# Patient Record
Sex: MALE | Race: WHITE | NOT HISPANIC OR LATINO | Employment: FULL TIME | URBAN - METROPOLITAN AREA
[De-identification: names, ages, dates, MRNs, and addresses within clinical notes are randomized per-mention and may not be internally consistent; named-entity substitution may affect disease eponyms.]

---

## 2018-01-08 ENCOUNTER — APPOINTMENT (OUTPATIENT)
Dept: MEDICAL GROUP | Facility: MEDICAL CENTER | Age: 38
End: 2018-01-08
Payer: COMMERCIAL

## 2018-01-19 ENCOUNTER — OFFICE VISIT (OUTPATIENT)
Dept: MEDICAL GROUP | Facility: MEDICAL CENTER | Age: 38
End: 2018-01-19
Payer: COMMERCIAL

## 2018-01-19 VITALS
WEIGHT: 177.8 LBS | SYSTOLIC BLOOD PRESSURE: 120 MMHG | OXYGEN SATURATION: 98 % | BODY MASS INDEX: 26.33 KG/M2 | HEIGHT: 69 IN | TEMPERATURE: 98.3 F | HEART RATE: 78 BPM | RESPIRATION RATE: 16 BRPM | DIASTOLIC BLOOD PRESSURE: 78 MMHG

## 2018-01-19 DIAGNOSIS — F90.0 ATTENTION DEFICIT HYPERACTIVITY DISORDER (ADHD), PREDOMINANTLY INATTENTIVE TYPE: ICD-10-CM

## 2018-01-19 DIAGNOSIS — Z00.00 WELL ADULT EXAM: ICD-10-CM

## 2018-01-19 PROCEDURE — 99385 PREV VISIT NEW AGE 18-39: CPT | Performed by: FAMILY MEDICINE

## 2018-01-19 RX ORDER — METHYLPHENIDATE HYDROCHLORIDE 20 MG/1
20 CAPSULE, EXTENDED RELEASE ORAL EVERY MORNING
COMMUNITY
End: 2018-07-25 | Stop reason: SDUPTHER

## 2018-01-19 ASSESSMENT — PATIENT HEALTH QUESTIONNAIRE - PHQ9: CLINICAL INTERPRETATION OF PHQ2 SCORE: 0

## 2018-01-19 NOTE — ASSESSMENT & PLAN NOTE
This is a chronic problem for this patient that he takes medication for depending on his overall state of health. When he is overly tired or findings that his thoughts are racing he will take the Metadate CD 20 mg daily. He does not currently need a prescription. He will notify me when he needs to get a prescription filled.

## 2018-01-19 NOTE — LETTER
January 19, 2018     Xander Saldivar  No address on file.      Dear Xander:    Thank you for enrolling in [employer]. Please follow the instructions below to securely access your online medical record. [employer] allows you to send messages to your healthcare team, view certain test results, renew your prescriptions, schedule appointments, and more.     How Do I Sign Up?  1. In your Internet browser, go to  [email or web address]  2. Click on the Sign Up Now link in the Sign In box. You will see the New Member Sign Up page.  3. Enter your [employer] Access Code exactly as it appears below (case sensitive). You will not need to use this code after you’ve completed the sign-up process. If you do not sign up before the expiration date, you must request a new code.  [employer] Access Code: [email or web address]  Expires: 2/18/2018  1:57 PM    4. Enter your Email address and Date of Birth (mm/dd/yyyy) as indicated and click Submit. You will be taken to the next sign-up page.  5. Create a [employer] ID (case sensitive) . This will be your [employer] login ID and cannot be changed, so think of one that is secure and easy to remember.  6. Create a [employer] password  (case sensitive).   · Your password must be a length of at least 6 characters/digits.  · It must include at least 1 numeric.  · You can change your password at any time.  7. Enter your Password Reset Question and Answer. This can be used at a later time if you forget your password.   8. Enter your e-mail address. You will receive e-mail notification when new information is available in [employer].  9. Click Sign Up. You can now view your medical record.     Additional Information  Please contact [employer] Customer Support at [phone] for any questions . Remember, [employer] is NOT to be used for urgent needs. For medical emergencies, dial 911.          Introducing [employer]    [facility]’s Secure, Online Health Connection      [facility] now offers convenient,  online access to your healthcare team and personal health information.  [employer] makes managing your healthcare easier than ever, and allows you to:  • Email your healthcare provider securely and privately  • Access your test results  • Request prescription refills 24 hours a day  • View your personal medical records from home  • Schedule or change your appointments  • View your Insurance and Billing Information  • Pay bills online ? Coming soon!        Sign below to get started:  I hereby request access to [employer] application.  I agree to abide by the [employer] Terms and Conditions, which will be provided to me upon activating my account.       __________________________________        _________________________  Name (Please Print)          Date of Birth     __________________________________       _________________________  Signature          Primary Care Provider      _______________  Date                          *For Internal Use Only: Please scan this form into the patient’s chart. Click on  - Select Patient - Attach to Encounter:  - Document Type: Consent   - Document Description: MyChart Consent

## 2018-01-19 NOTE — PROGRESS NOTES
CC: Here for an annual wellness exam    HISTORY OF THE PRESENT ILLNESS: Patient is a 37 y.o. male. This pleasant patient is here today to establish care and have an annual wellness exam. This patient has been very healthy all of his life. He has one chronic health condition that he has done well with since childhood.    Health Maintenance: Patient does need to get a flu shot and he will do that at the pharmacy      Well adult exam  This patient is very healthy, does have some ADD for which he occasionally will use Metadate CD 20 mg. He does not take it all the time. In his previous job he had to use it more often. He only uses it now times where he is not able to get adequate sleep and knows he will have difficulty with concentration.    Attention deficit hyperactivity disorder (ADHD), predominantly inattentive type  This is a chronic problem for this patient that he takes medication for depending on his overall state of health. When he is overly tired or findings that his thoughts are racing he will take the Metadate CD 20 mg daily. He does not currently need a prescription. He will notify me when he needs to get a prescription filled.    Allergies: Cillins [penicillins]    Current Outpatient Prescriptions Ordered in [facility]   Medication Sig Dispense Refill   • methylphenidate (METADATE CD) 20 MG Cap CR Take 20 mg by mouth every morning.       No current [facility]-ordered facility-administered medications on file.        Past Medical History:   Diagnosis Date   • Attention deficit hyperactivity disorder (ADHD), predominantly inattentive type 1/19/2018   • Well adult exam 1/19/2018       Past Surgical History:   Procedure Laterality Date   • ACL RECONSTRUCTION SCOPE Right    • DENTAL EXTRACTION(S)         Social History   Substance Use Topics   • Smoking status: Never Smoker   • Smokeless tobacco: Never Used   • Alcohol use 1.2 oz/week     2 Cans of beer per week       Social History     Social History Narrative   • No  "narrative on file       Family History   Problem Relation Age of Onset   • No Known Problems Mother    • No Known Problems Father    • Cancer Sister      thyroid       ROS:     - Constitutional: Negative for fever, chills, unexpected weight change, and fatigue/generalized weakness.     - HEENT: Negative for headaches, vision changes, hearing changes, ear pain, ear discharge, rhinorrhea, sinus congestion, sore throat, and neck pain.      - Respiratory: Negative for cough, sputum production, chest congestion, dyspnea, wheezing, and crackles.      - Cardiovascular: Negative for chest pain, palpitations, orthopnea, and bilateral lower extremity edema.     - Gastrointestinal: Negative for heartburn, nausea, vomiting, abdominal pain, hematochezia, melena, diarrhea, constipation, and greasy/foul-smelling stools.     - Genitourinary: Negative for dysuria, polyuria, hematuria, pyuria, urinary urgency, and urinary incontinence.     - Musculoskeletal: Negative for myalgias, back pain, and joint pain.     - Skin: Negative for rash, itching, cyanotic skin color change.     - Neurological: Negative for dizziness, tingling, tremors, focal sensory deficit, focal weakness and headaches.     - Endo/Heme/Allergies: Does not bruise/bleed easily.     - Psychiatric/Behavioral: Negative for depression, suicidal/homicidal ideation and memory loss.        - NOTE: All other systems reviewed and are negative, except as in HPI.      .      Exam: Blood pressure 120/78, pulse 78, temperature 36.8 °C (98.3 °F), resp. rate 16, height 1.753 m (5' 9\"), weight 80.6 kg (177 lb 12.8 oz), SpO2 98 %. Body mass index is 26.26 kg/m².    General: Normal appearing. No distress.  HEENT: Normocephalic. Eyes conjunctiva clear lids without ptosis, pupils equal and reactive to light accommodation, ears normal shape and contour, canals are clear bilaterally, tympanic membranes are benign, nasal mucosa benign, oropharynx is without erythema, edema or exudates. "   Neck: Supple without JVD or bruit. Thyroid is not enlarged.  Pulmonary: Clear to ausculation.  Normal effort. No rales, ronchi, or wheezing.  Cardiovascular: Regular rate and rhythm without murmur. Carotid and radial pulses are intact and equal bilaterally.  Abdomen: Soft, nontender, nondistended. Normal bowel sounds. Liver and spleen are not palpable  Neurologic: Grossly nonfocal  Lymph: No cervical, supraclavicular or axillary lymph nodes are palpable  Skin: Warm and dry.  No obvious lesions.  Musculoskeletal: Normal gait. No extremity cyanosis, clubbing, or edema.  Psych: Normal mood and affect. Alert and oriented x3. Judgment and insight is normal.    Please note that this dictation was created using voice recognition software. I have made every reasonable attempt to correct obvious errors, but I expect that there are errors of grammar and possibly content that I did not discover before finalizing the note.      Assessment/Plan  1. Well adult exam  Completed today. Patient will get a conference of metabolic panel lipid profile and a CBC. Those are all in the normal range she does not need to be seen again for one year.  - COMP METABOLIC PANEL; Future  - LIPID PROFILE; Future  - CBC WITH DIFFERENTIAL; Future    2. Attention deficit hyperactivity disorder (ADHD), predominantly inattentive type  Controlled with current meds and lifestyle. Patient will let me know when he needs a refill of his meds.

## 2018-01-26 RX ORDER — CIPROFLOXACIN HYDROCHLORIDE 3.5 MG/ML
1 SOLUTION/ DROPS TOPICAL
Qty: 1 BOTTLE | Refills: 0 | Status: SHIPPED | OUTPATIENT
Start: 2018-01-26 | End: 2018-01-26 | Stop reason: SDUPTHER

## 2018-01-26 RX ORDER — CIPROFLOXACIN HYDROCHLORIDE 3.5 MG/ML
1 SOLUTION/ DROPS TOPICAL
Qty: 1 BOTTLE | Refills: 0 | Status: SHIPPED | OUTPATIENT
Start: 2018-01-26 | End: 2018-07-25

## 2018-04-05 ENCOUNTER — NON-PROVIDER VISIT (OUTPATIENT)
Dept: URGENT CARE | Facility: CLINIC | Age: 38
End: 2018-04-05

## 2018-04-05 DIAGNOSIS — Z11.1 PPD SCREENING TEST: ICD-10-CM

## 2018-04-05 PROCEDURE — 86580 TB INTRADERMAL TEST: CPT | Performed by: NURSE PRACTITIONER

## 2018-04-07 ENCOUNTER — NON-PROVIDER VISIT (OUTPATIENT)
Dept: URGENT CARE | Facility: CLINIC | Age: 38
End: 2018-04-07

## 2018-04-07 LAB — TB WHEAL 3D P 5 TU DIAM: NORMAL MM

## 2018-05-24 ENCOUNTER — APPOINTMENT (OUTPATIENT)
Dept: MEDICAL GROUP | Age: 38
End: 2018-05-24
Payer: COMMERCIAL

## 2018-07-25 ENCOUNTER — OFFICE VISIT (OUTPATIENT)
Dept: MEDICAL GROUP | Facility: MEDICAL CENTER | Age: 38
End: 2018-07-25
Payer: COMMERCIAL

## 2018-07-25 VITALS
TEMPERATURE: 98.9 F | SYSTOLIC BLOOD PRESSURE: 112 MMHG | HEART RATE: 84 BPM | OXYGEN SATURATION: 94 % | HEIGHT: 69 IN | DIASTOLIC BLOOD PRESSURE: 72 MMHG | BODY MASS INDEX: 26.69 KG/M2 | WEIGHT: 180.2 LBS

## 2018-07-25 DIAGNOSIS — R53.82 CHRONIC FATIGUE: ICD-10-CM

## 2018-07-25 DIAGNOSIS — R19.02 LEFT UPPER QUADRANT ABDOMINAL MASS: ICD-10-CM

## 2018-07-25 DIAGNOSIS — F90.0 ATTENTION DEFICIT HYPERACTIVITY DISORDER (ADHD), PREDOMINANTLY INATTENTIVE TYPE: ICD-10-CM

## 2018-07-25 PROCEDURE — 99214 OFFICE O/P EST MOD 30 MIN: CPT | Performed by: FAMILY MEDICINE

## 2018-07-25 RX ORDER — METHYLPHENIDATE HYDROCHLORIDE 20 MG/1
20 CAPSULE, EXTENDED RELEASE ORAL 2 TIMES DAILY
Qty: 56 CAP | Refills: 0 | Status: SHIPPED | OUTPATIENT
Start: 2018-09-19 | End: 2018-08-09 | Stop reason: SDUPTHER

## 2018-07-25 RX ORDER — METHYLPHENIDATE HYDROCHLORIDE 20 MG/1
20 CAPSULE, EXTENDED RELEASE ORAL 2 TIMES DAILY
Qty: 56 CAP | Refills: 0 | Status: SHIPPED | OUTPATIENT
Start: 2018-07-25 | End: 2018-07-25 | Stop reason: SDUPTHER

## 2018-07-25 RX ORDER — METHYLPHENIDATE HYDROCHLORIDE 20 MG/1
20 CAPSULE, EXTENDED RELEASE ORAL 2 TIMES DAILY
Qty: 56 CAP | Refills: 0 | Status: SHIPPED | OUTPATIENT
Start: 2018-08-22 | End: 2018-07-25 | Stop reason: SDUPTHER

## 2018-07-25 NOTE — ASSESSMENT & PLAN NOTE
This is a chronic problem that has been ongoing for about 3 months.  Patient has recently started a new job with a great deal of stress as well as being out of his ADHD meds which may all be contributing to this.  I do want to get some blood work and screen him for anemia and thyroid problems as well as metabolic problems.

## 2018-07-25 NOTE — PROGRESS NOTES
CC:Diagnoses of Attention deficit hyperactivity disorder (ADHD), predominantly inattentive type, Left upper quadrant abdominal mass, and Chronic fatigue were pertinent to this visit.    HISTORY OF PRESENT ILLNESS: Patient is a 38 y.o. male established patient who presents today to discuss several conditions of concern including a mass that has been present for approximately 2 months on the left flank area in the mid axillary line.  In addition we had a discussion about his attention deficit hyperactivity disorder and the medications.    Health Maintenance: Completed    Attention deficit hyperactivity disorder (ADHD), predominantly inattentive type  This is a chronic health problem that is well controlled with current medications and lifestyle measures.  Patient will continue with current meds.  Patient tells me that in the past it has bothered him that he required this med so he did not take it on a regular basis.  He is now under a great deal of stress with a new job and having to manage multiple projects in different areas.  He needs to be able to concentrate impeccably.  I recommended that he get back on his medication and take it on a daily basis.  He is willing to do this.    Obtained and reviewed patient utilization report from [facility] pharmacy database on 7/25/2018 3:10 PM  prior to writing prescription for controlled substance II, III or IV per [place] bill . Based on assessment of the report,my physical exam if necessary and the patient's health problem, the prescription is medically necessary.       Left upper quadrant abdominal mass  This is a problem that started about 2 months ago.  Showed up evidently after the patient had to do some heavy lifting there was some concern that it might be a torn muscle.  When in actuality it has continued in the same place and actually gotten slightly larger.  There was no bruising associated with it.  There are times where it is tender.  But after watching the  way the patient palpates that I can imagine that it would get tenderU upon palpation it appears to be a lipoma and it may have to others in close proximity.  We will check with ultrasound.    Chronic fatigue  This is a chronic problem that has been ongoing for about 3 months.  Patient has recently started a new job with a great deal of stress as well as being out of his ADHD meds which may all be contributing to this.  I do want to get some blood work and screen him for anemia and thyroid problems as well as metabolic problems.      Patient Active Problem List    Diagnosis Date Noted   • Left upper quadrant abdominal mass 07/25/2018   • Chronic fatigue 07/25/2018   • Well adult exam 01/19/2018   • Attention deficit hyperactivity disorder (ADHD), predominantly inattentive type 01/19/2018      Allergies:Cillins [penicillins]    Current Outpatient Prescriptions   Medication Sig Dispense Refill   • [START ON 9/19/2018] methylphenidate (METADATE CD) 20 MG Cap CR Take 1 Cap by mouth 2 Times a Day for 28 days. 56 Cap 0     No current facility-administered medications for this visit.        Social History   Substance Use Topics   • Smoking status: Never Smoker   • Smokeless tobacco: Never Used   • Alcohol use 1.2 oz/week     2 Cans of beer per week     Social History     Social History Narrative   • No narrative on file       Family History   Problem Relation Age of Onset   • No Known Problems Mother    • No Known Problems Father    • Cancer Sister         thyroid        ROS:     - Constitutional:  Negative for fever, chills, unexpected weight change, and fatigue/generalized weakness.    - HEENT:  Negative for headaches, vision changes, hearing changes, ear pain, ear discharge, rhinorrhea, sinus congestion, sore throat, and neck pain.      - Respiratory: Negative for cough, sputum production, chest congestion, dyspnea, wheezing, and crackles.      - Cardiovascular: Negative for chest pain, palpitations, orthopnea, and  "bilateral lower extremity edema.     - Gastrointestinal: Negative for heartburn, nausea, vomiting, abdominal pain, hematochezia, melena, diarrhea, constipation, and greasy/foul-smelling stools.     - Genitourinary: Negative for dysuria, polyuria, hematuria, pyuria, urinary urgency, and urinary incontinence.     - Musculoskeletal: Negative for myalgias, back pain, and joint pain.     - Skin: Mass as described above otherwise denies rash, itching, cyanotic skin color change.     - Neurological: Negative for dizziness, tingling, tremors, focal sensory deficit, focal weakness and headaches.     - Endo/Heme/Allergies: Does not bruise/bleed easily.     - Psychiatric/Behavioral: Negative for depression, suicidal/homicidal ideation and memory loss.          - NOTE: All other systems reviewed and are negative, except as in HPI.      Exam:    Blood pressure 112/72, pulse 84, temperature 37.2 °C (98.9 °F), height 1.74 m (5' 8.5\"), weight 81.7 kg (180 lb 3.2 oz), SpO2 94 %. Body mass index is 27 kg/m².    General:  Well nourished, well developed male in NAD  Head is grossly normal.  Neck: Supple without JVD or bruit. Thyroid is not enlarged.  Pulmonary: Clear to ausculation and percussion.  Normal effort. No rales, ronchi, or wheezing.  Cardiovascular: Regular rate and rhythm without murmur. Carotid and radial pulses are intact and equal bilaterally.  Abdomen: Flat soft nondistended no hepatosplenomegaly.  There is a mass measuring approximately 1-1/2 cm in length and half centimeter in width in the mid axillary line at approximately the level of T8.  I suspect this is a lipoma has a rubbery consistency but is not freely movable..    Please note that this dictation was created using voice recognition software. I have made every reasonable attempt to correct obvious errors, but I expect that there are errors of grammar and possibly content that I did not discover before finalizing the note.    Assessment/Plan:  1. Attention " deficit hyperactivity disorder (ADHD), predominantly inattentive type  Uncontrolled, patient will get back on his medication taking it on a daily basis.  Gave him 3 one-month prescriptions.  We will see him back before he runs out of medication.  He will notify me via my chart as to how he is doing on the medication.  - methylphenidate (METADATE CD) 20 MG Cap CR; Take 1 Cap by mouth 2 Times a Day for 28 days.  Dispense: 56 Cap; Refill: 0    2. Left upper quadrant abdominal mass  Uncontrolled, I suspect this is a lipoma, patient willing to get ultrasound to look at this mass.  If it is a lipoma he can consider having it removed.  He states that he can feel it with certain movements and it can be uncomfortable.  - US-ABDOMEN LIMITED; Future    3. Chronic fatigue  Uncontrolled, I would like to check blood work to make certain that there is not something else contributing to his fatigue of the metabolic nature.  I suspect it has more to do with his new projects and new position.  - CBC WITH DIFFERENTIAL; Future  - COMP METABOLIC PANEL; Future  - TSH WITH REFLEX TO FT4; Future

## 2018-07-25 NOTE — ASSESSMENT & PLAN NOTE
This is a chronic health problem that is well controlled with current medications and lifestyle measures.  Patient will continue with current meds.      Obtained and reviewed patient utilization report from [facility] pharmacy database on 7/25/2018 3:10 PM  prior to writing prescription for controlled substance II, III or IV per [place] bill . Based on assessment of the report,my physical exam if necessary and the patient's health problem, the prescription is medically necessary.

## 2018-07-26 ENCOUNTER — TELEPHONE (OUTPATIENT)
Dept: MEDICAL GROUP | Facility: MEDICAL CENTER | Age: 38
End: 2018-07-26

## 2018-07-26 NOTE — TELEPHONE ENCOUNTER
MEDICATION PRIOR AUTHORIZATION NEEDED:    1. Name of Medication: Methylphenidate HCL ER    2. Requested By (Name of Pharmacy): [facility]     3. Is insurance on file current? Yes    4. What is the name & phone number of the 3rd party payor? [employer] [phone]

## 2018-07-27 NOTE — TELEPHONE ENCOUNTER
DOCUMENTATION OF PAR STATUS:    1. Name of Medication & Dose: Methylphenidate HCL ER 20mg      2. Name of Prescription Coverage Company & phone #: [employer] [phone]    3. Date Prior Auth Submitted: 7/26/2018    4. What information was given to obtain insurance decision? ID info    5. Prior Auth Status? Currently Pending    6. Patient Notified: N\A

## 2018-08-07 ENCOUNTER — TELEPHONE (OUTPATIENT)
Dept: MEDICAL GROUP | Facility: MEDICAL CENTER | Age: 38
End: 2018-08-07

## 2018-08-07 NOTE — LETTER
August 8, 2018  [facility]  Attention: Pharmacy review  Fax:[phone]        Xanderdenys Mota Saldivar  [address] [facility] [address]  Member ID:[id number] my patient listed above with the date of birth [id number]      To Whom It May Concern:    My patient listed above with the date of birth [birth year] requires methylphenidate extended release 20 mg capsules 1 p.o. twice daily to adequately control his ADHD.  He received a notice of noncoverage of prescription drugs because of the number of tablets that were prescribed.    This patient requires the 2 doses a day to adequately control his symptomatology throughout his day, but if he takes a 40 mg dose he has difficulty with concentration, experiences increased sweating and general anxiety.  I am requesting/appealing that you cover this medication for our mutual patient.        If you have any questions or concerns, please don't hesitate to call.        Sincerely,        Estephania Banks M.D.    Cc: Xander Saldivar    Electronically Signed

## 2018-08-07 NOTE — TELEPHONE ENCOUNTER
Please contact patient's insurance he is evidently having problems getting his ADHD medication filled.  His insurance told him that they sent test paperwork that has not been completed.  I am referring to methylphenidate ER 20 mg tablets.

## 2018-08-07 NOTE — LETTER
August 8, 2018  [facility]  Attention: Pharmacy review  Fax:[phone]    Xander Nakul Saldivar  [address] [facility] [address]    To Whom It May Concern:    My patient listed above with the date of birth [birth year] requires methylphenidate extended release 20 mg capsules 1 p.o. twice daily to adequately control his ADHD.  He received a notice of noncoverage of prescription drugs because of the number of tablets that were prescribed.    This patient requires the 2 doses a day to adequately control his symptomatology throughout his day, but if he takes a 40 mg dose he has difficulty with concentration, experiences increased sweating and general anxiety.  I am requesting/appealing that you cover this medication for our mutual patient.    If you have any questions or concerns, please don't hesitate to call.        Sincerely,        Estephania Banks M.D.    Cc: Xander Saldivar

## 2018-08-08 NOTE — TELEPHONE ENCOUNTER
Letter completed today and faxed to the patient's insurance company [facility].

## 2018-08-08 NOTE — TELEPHONE ENCOUNTER
"· Prior Auth paperwork received from [facility] requiring provider signature.     · All appropriate fields completed by Medical Assistant: No    · Paperwork placed in \"MA to Provider\" folder/basket. Awaiting provider completion/signature.     Paperwork sent in to inform that the quantity of the drug requested is more than the policy allows. They are requesting a smaller quantity or higher strength.  Peer to peer conversation available at:    [phone]      "

## 2018-08-09 ENCOUNTER — TELEPHONE (OUTPATIENT)
Dept: MEDICAL GROUP | Facility: MEDICAL CENTER | Age: 38
End: 2018-08-09

## 2018-08-09 DIAGNOSIS — F90.0 ATTENTION DEFICIT HYPERACTIVITY DISORDER (ADHD), PREDOMINANTLY INATTENTIVE TYPE: ICD-10-CM

## 2018-08-09 RX ORDER — METHYLPHENIDATE HYDROCHLORIDE 20 MG/1
20 CAPSULE, EXTENDED RELEASE ORAL 2 TIMES DAILY
Qty: 56 CAP | Refills: 0 | Status: SHIPPED | OUTPATIENT
Start: 2018-09-06 | End: 2018-08-09 | Stop reason: SDUPTHER

## 2018-08-09 RX ORDER — METHYLPHENIDATE HYDROCHLORIDE 20 MG/1
20 CAPSULE, EXTENDED RELEASE ORAL 2 TIMES DAILY
Qty: 56 CAP | Refills: 0 | Status: SHIPPED | OUTPATIENT
Start: 2018-10-04 | End: 2018-09-28

## 2018-08-09 RX ORDER — METHYLPHENIDATE HYDROCHLORIDE 20 MG/1
20 CAPSULE, EXTENDED RELEASE ORAL 2 TIMES DAILY
Qty: 56 CAP | Refills: 0 | Status: SHIPPED | OUTPATIENT
Start: 2018-08-09 | End: 2018-08-09 | Stop reason: SDUPTHER

## 2018-08-09 NOTE — TELEPHONE ENCOUNTER
1. Caller Name: Xander Saldivar                                           Call Back Number: [phone] (home)         Patient approves a detailed voicemail message: N\A    Prior auth from [facility] approved 60 capsules for 30 days but patient picked up 30 capsules prior to approval. [facility] asks that we send another rX over for the remainder of medication.

## 2018-08-09 NOTE — PROGRESS NOTES
This patient's insurance/pharmacy benefit would not cover his medications until we wrote a letter of appeal.  Now his pharmacy states that they need a new prescription for the coming 3 months.  I will write those prescriptions today and patient will pick them up and take them directly to the pharmacy.

## 2018-08-09 NOTE — TELEPHONE ENCOUNTER
Received a faxed approval from [facility] for Xander Saldivar's Metadate CD 20mg. The fax is scanned into the patient .

## 2018-08-13 NOTE — TELEPHONE ENCOUNTER
FINAL PRIOR AUTHORIZATION STATUS:    1.  Name of Medication & Dose: Methylphenidate HCL ER 20mg     2. Prior Auth Status: Approved through 08/2019     3. Action Taken: Pharmacy Notified: yes Patient Notified: yes

## 2018-08-30 ENCOUNTER — APPOINTMENT (RX ONLY)
Dept: URBAN - METROPOLITAN AREA CLINIC 4 | Facility: CLINIC | Age: 38
Setting detail: DERMATOLOGY
End: 2018-08-30

## 2018-08-30 DIAGNOSIS — L81.4 OTHER MELANIN HYPERPIGMENTATION: ICD-10-CM

## 2018-08-30 DIAGNOSIS — L82.1 OTHER SEBORRHEIC KERATOSIS: ICD-10-CM

## 2018-08-30 DIAGNOSIS — L57.0 ACTINIC KERATOSIS: ICD-10-CM

## 2018-08-30 DIAGNOSIS — D22 MELANOCYTIC NEVI: ICD-10-CM

## 2018-08-30 DIAGNOSIS — D18.0 HEMANGIOMA: ICD-10-CM

## 2018-08-30 PROBLEM — D22.5 MELANOCYTIC NEVI OF TRUNK: Status: ACTIVE | Noted: 2018-08-30

## 2018-08-30 PROBLEM — D22.62 MELANOCYTIC NEVI OF LEFT UPPER LIMB, INCLUDING SHOULDER: Status: ACTIVE | Noted: 2018-08-30

## 2018-08-30 PROBLEM — D48.5 NEOPLASM OF UNCERTAIN BEHAVIOR OF SKIN: Status: ACTIVE | Noted: 2018-08-30

## 2018-08-30 PROBLEM — D22.61 MELANOCYTIC NEVI OF RIGHT UPPER LIMB, INCLUDING SHOULDER: Status: ACTIVE | Noted: 2018-08-30

## 2018-08-30 PROBLEM — D22.72 MELANOCYTIC NEVI OF LEFT LOWER LIMB, INCLUDING HIP: Status: ACTIVE | Noted: 2018-08-30

## 2018-08-30 PROBLEM — D18.01 HEMANGIOMA OF SKIN AND SUBCUTANEOUS TISSUE: Status: ACTIVE | Noted: 2018-08-30

## 2018-08-30 PROBLEM — D22.71 MELANOCYTIC NEVI OF RIGHT LOWER LIMB, INCLUDING HIP: Status: ACTIVE | Noted: 2018-08-30

## 2018-08-30 PROCEDURE — ? BIOPSY BY SHAVE METHOD

## 2018-08-30 PROCEDURE — 17000 DESTRUCT PREMALG LESION: CPT

## 2018-08-30 PROCEDURE — 99202 OFFICE O/P NEW SF 15 MIN: CPT | Mod: 25

## 2018-08-30 PROCEDURE — 11100: CPT | Mod: 59

## 2018-08-30 PROCEDURE — ? COUNSELING

## 2018-08-30 PROCEDURE — ? SUNSCREEN RECOMMENDATIONS

## 2018-08-30 PROCEDURE — ? ADDITIONAL NOTES

## 2018-08-30 PROCEDURE — ? LIQUID NITROGEN

## 2018-08-30 ASSESSMENT — LOCATION DETAILED DESCRIPTION DERM
LOCATION DETAILED: LEFT ULNAR DORSAL HAND
LOCATION DETAILED: RIGHT RIB CAGE
LOCATION DETAILED: LEFT PROXIMAL DORSAL FOREARM
LOCATION DETAILED: LEFT ANTERIOR PROXIMAL THIGH
LOCATION DETAILED: RIGHT RADIAL DORSAL HAND
LOCATION DETAILED: LEFT PROXIMAL POSTERIOR UPPER ARM
LOCATION DETAILED: RIGHT SUPERIOR MEDIAL MIDBACK
LOCATION DETAILED: SUPERIOR THORACIC SPINE
LOCATION DETAILED: LEFT SUPERIOR MEDIAL UPPER BACK
LOCATION DETAILED: LEFT ANTERIOR SHOULDER
LOCATION DETAILED: LEFT CENTRAL MALAR CHEEK
LOCATION DETAILED: LEFT INFERIOR ANTERIOR NECK
LOCATION DETAILED: RIGHT DISTAL POSTERIOR UPPER ARM
LOCATION DETAILED: RIGHT CENTRAL MALAR CHEEK
LOCATION DETAILED: LEFT ANTERIOR EARLOBE
LOCATION DETAILED: RIGHT ANTERIOR PROXIMAL THIGH
LOCATION DETAILED: RIGHT PROXIMAL DORSAL FOREARM
LOCATION DETAILED: PERIUMBILICAL SKIN

## 2018-08-30 ASSESSMENT — LOCATION SIMPLE DESCRIPTION DERM
LOCATION SIMPLE: UPPER BACK
LOCATION SIMPLE: LEFT POSTERIOR UPPER ARM
LOCATION SIMPLE: RIGHT THIGH
LOCATION SIMPLE: LEFT ANTERIOR NECK
LOCATION SIMPLE: LEFT CHEEK
LOCATION SIMPLE: RIGHT LOWER BACK
LOCATION SIMPLE: LEFT HAND
LOCATION SIMPLE: RIGHT POSTERIOR UPPER ARM
LOCATION SIMPLE: LEFT THIGH
LOCATION SIMPLE: LEFT EAR
LOCATION SIMPLE: LEFT UPPER BACK
LOCATION SIMPLE: LEFT SHOULDER
LOCATION SIMPLE: LEFT FOREARM
LOCATION SIMPLE: RIGHT FOREARM
LOCATION SIMPLE: RIGHT CHEEK
LOCATION SIMPLE: RIGHT HAND
LOCATION SIMPLE: ABDOMEN

## 2018-08-30 ASSESSMENT — LOCATION ZONE DERM
LOCATION ZONE: TRUNK
LOCATION ZONE: NECK
LOCATION ZONE: FACE
LOCATION ZONE: LEG
LOCATION ZONE: EAR
LOCATION ZONE: HAND
LOCATION ZONE: ARM

## 2018-08-30 NOTE — PROCEDURE: LIQUID NITROGEN
Post-Care Instructions: I reviewed with the patient in detail post-care instructions. Patient is to wear sunprotection, and avoid picking at any of the treated lesions. Pt may apply Vaseline to crusted or scabbing areas.
Detail Level: Simple
Render Post-Care Instructions In Note?: no
Duration Of Freeze Thaw-Cycle (Seconds): 3
Consent: The patient's consent was obtained including but not limited to risks of crusting, scabbing, blistering, scarring, darker or lighter pigmentary change, recurrence, incomplete removal and infection.
Number Of Freeze-Thaw Cycles: 2 freeze-thaw cycles

## 2018-08-30 NOTE — PROCEDURE: BIOPSY BY SHAVE METHOD
Anesthesia Type: 1% lidocaine with epinephrine
Render Post-Care Instructions In Note?: no
Depth Of Biopsy: dermis
Type Of Destruction Used: Curettage
Notification Instructions: Patient will be notified of biopsy results. However, patient instructed to call the office if not contacted within 2 weeks.
Detail Level: Detailed
Billing Type: Third-Party Bill
Curettage Text: The wound bed was treated with curettage after the biopsy was performed.
Additional Anesthesia Volume In Cc (Will Not Render If 0): 0
Hemostasis: Drysol
Electrodesiccation Text: The wound bed was treated with electrodesiccation after the biopsy was performed.
Dressing: bandage
Wound Care: Bacitracin
Cryotherapy Text: The wound bed was treated with cryotherapy after the biopsy was performed.
Electrodesiccation And Curettage Text: The wound bed was treated with electrodesiccation and curettage after the biopsy was performed.
Anesthesia Volume In Cc: 2
Silver Nitrate Text: The wound bed was treated with silver nitrate after the biopsy was performed.
Was A Bandage Applied: Yes
Biopsy Method: Personna blade
Post-Care Instructions: I reviewed with the patient in detail post-care instructions. Patient is to keep the biopsy site dry overnight, and then apply bacitracin twice daily until healed. Patient may apply hydrogen peroxide soaks to remove any crusting.
Lab Facility: 
Lab: [id number]
Size Of Lesion In Cm: 0.4
Biopsy Type: H and E
Consent: Written consent was obtained and risks were reviewed including but not limited to scarring, infection, bleeding, scabbing, incomplete removal, nerve damage and allergy to anesthesia.

## 2018-08-30 NOTE — HPI: SKIN LESION
Is This A New Presentation, Or A Follow-Up?: Skin Lesion
How Severe Is Your Skin Lesion?: mild
Has Your Skin Lesion Been Treated?: been treated
When Was It Treated?: 01/01/2016

## 2018-09-28 ENCOUNTER — OFFICE VISIT (OUTPATIENT)
Dept: MEDICAL GROUP | Facility: MEDICAL CENTER | Age: 38
End: 2018-09-28
Payer: COMMERCIAL

## 2018-09-28 VITALS
TEMPERATURE: 97.6 F | OXYGEN SATURATION: 97 % | HEART RATE: 63 BPM | HEIGHT: 69 IN | WEIGHT: 178 LBS | SYSTOLIC BLOOD PRESSURE: 120 MMHG | BODY MASS INDEX: 26.36 KG/M2 | DIASTOLIC BLOOD PRESSURE: 76 MMHG

## 2018-09-28 DIAGNOSIS — Z00.00 ANNUAL PHYSICAL EXAM: ICD-10-CM

## 2018-09-28 DIAGNOSIS — F90.0 ATTENTION DEFICIT HYPERACTIVITY DISORDER (ADHD), PREDOMINANTLY INATTENTIVE TYPE: ICD-10-CM

## 2018-09-28 PROCEDURE — 99396 PREV VISIT EST AGE 40-64: CPT | Performed by: FAMILY MEDICINE

## 2018-09-28 RX ORDER — METHYLPHENIDATE HYDROCHLORIDE EXTENDED RELEASE 20 MG/1
20 TABLET ORAL 2 TIMES DAILY
Qty: 60 TAB | Refills: 0 | Status: SHIPPED | OUTPATIENT
Start: 2018-11-23 | End: 2019-01-14 | Stop reason: SDUPTHER

## 2018-09-28 RX ORDER — METHYLPHENIDATE HYDROCHLORIDE EXTENDED RELEASE 20 MG/1
20 TABLET ORAL 2 TIMES DAILY
Qty: 60 TAB | Refills: 0 | Status: SHIPPED | OUTPATIENT
Start: 2018-10-26 | End: 2018-09-28 | Stop reason: SDUPTHER

## 2018-09-28 RX ORDER — METHYLPHENIDATE HYDROCHLORIDE EXTENDED RELEASE 20 MG/1
20 TABLET ORAL 2 TIMES DAILY
Qty: 60 TAB | Refills: 0 | Status: SHIPPED | OUTPATIENT
Start: 2018-09-28 | End: 2018-09-28 | Stop reason: SDUPTHER

## 2018-09-28 NOTE — ASSESSMENT & PLAN NOTE
Has been taking metadate 20 mg twice daily, getting dry mouth and tremor. He was previously on metadate ER 20 mg twice daily, which worked better for him.

## 2018-10-12 ENCOUNTER — APPOINTMENT (OUTPATIENT)
Dept: MEDICAL GROUP | Facility: MEDICAL CENTER | Age: 38
End: 2018-10-12
Payer: COMMERCIAL

## 2019-01-08 ENCOUNTER — TELEPHONE (OUTPATIENT)
Dept: MEDICAL GROUP | Facility: MEDICAL CENTER | Age: 39
End: 2019-01-08

## 2019-01-08 NOTE — TELEPHONE ENCOUNTER
Patient requesting his next refill for Metadate ER 20mg. He was to call in when due. Please advise.    .

## 2019-01-11 ENCOUNTER — APPOINTMENT (OUTPATIENT)
Dept: MEDICAL GROUP | Facility: MEDICAL CENTER | Age: 39
End: 2019-01-11
Payer: COMMERCIAL

## 2019-01-11 ENCOUNTER — TELEPHONE (OUTPATIENT)
Dept: MEDICAL GROUP | Facility: MEDICAL CENTER | Age: 39
End: 2019-01-11

## 2019-01-14 ENCOUNTER — OFFICE VISIT (OUTPATIENT)
Dept: MEDICAL GROUP | Facility: MEDICAL CENTER | Age: 39
End: 2019-01-14
Payer: COMMERCIAL

## 2019-01-14 VITALS
DIASTOLIC BLOOD PRESSURE: 70 MMHG | SYSTOLIC BLOOD PRESSURE: 126 MMHG | RESPIRATION RATE: 14 BRPM | BODY MASS INDEX: 28.67 KG/M2 | WEIGHT: 193.56 LBS | HEIGHT: 69 IN | TEMPERATURE: 98.2 F | OXYGEN SATURATION: 96 % | HEART RATE: 71 BPM

## 2019-01-14 DIAGNOSIS — F90.0 ATTENTION DEFICIT HYPERACTIVITY DISORDER (ADHD), PREDOMINANTLY INATTENTIVE TYPE: ICD-10-CM

## 2019-01-14 DIAGNOSIS — R53.82 CHRONIC FATIGUE: ICD-10-CM

## 2019-01-14 PROCEDURE — 99214 OFFICE O/P EST MOD 30 MIN: CPT | Performed by: FAMILY MEDICINE

## 2019-01-14 RX ORDER — METHYLPHENIDATE HYDROCHLORIDE EXTENDED RELEASE 20 MG/1
20 TABLET ORAL 2 TIMES DAILY
Qty: 60 TAB | Refills: 0 | Status: SHIPPED | OUTPATIENT
Start: 2019-03-15 | End: 2019-04-08 | Stop reason: SDUPTHER

## 2019-01-14 RX ORDER — METHYLPHENIDATE HYDROCHLORIDE EXTENDED RELEASE 20 MG/1
20 TABLET ORAL 2 TIMES DAILY
Qty: 60 TAB | Refills: 0 | Status: SHIPPED | OUTPATIENT
Start: 2019-01-14 | End: 2019-01-14 | Stop reason: SDUPTHER

## 2019-01-14 RX ORDER — METHYLPHENIDATE HYDROCHLORIDE EXTENDED RELEASE 20 MG/1
20 TABLET ORAL 2 TIMES DAILY
Qty: 60 TAB | Refills: 0 | Status: SHIPPED | OUTPATIENT
Start: 2019-02-13 | End: 2019-01-14 | Stop reason: SDUPTHER

## 2019-01-14 ASSESSMENT — PATIENT HEALTH QUESTIONNAIRE - PHQ9: CLINICAL INTERPRETATION OF PHQ2 SCORE: 0

## 2019-01-16 PROBLEM — R19.02 LEFT UPPER QUADRANT ABDOMINAL MASS: Status: RESOLVED | Noted: 2018-07-25 | Resolved: 2019-01-16

## 2019-01-16 NOTE — ASSESSMENT & PLAN NOTE
This is a chronic health problem that has improved getting his ADHD under better control.  Patient will continue with current medications and we will resolve this issue

## 2019-01-16 NOTE — ASSESSMENT & PLAN NOTE
This is a chronic health problem for this patient for which she is utilizing methylphenidate ER 20 mg tablets.  We had him on capsules which just made him feel too jittery.  He was seen by Dr. Nice back in September who transitioned him over to these tablets.  Patient finds that the tablets worked very well.  He is able to keep his concentration and focus without feeling borderline anxious and jittery.  He would like to continue with this medication long-term.  Obtained and reviewed patient utilization report from [facility] pharmacy database on 1/14/2019 4:44 PM  prior to writing prescription for controlled substance II, III or IV per [place] bill . Based on assessment of the report,my physical exam if necessary and the patient's health problem, the prescription is medically necessary.

## 2019-01-16 NOTE — PROGRESS NOTES
CC:Diagnoses of Attention deficit hyperactivity disorder (ADHD), predominantly inattentive type and Chronic fatigue were pertinent to this visit.    HISTORY OF PRESENT ILLNESS: Patient is a 38 y.o. male established patient who presents today to follow-up on chronic health problems as outlined below.  Patient states that he is actually out to the med because we had to move his appointment from Friday.    Health Maintenance: Completed    Attention deficit hyperactivity disorder (ADHD), predominantly inattentive type  This is a chronic health problem for this patient for which she is utilizing methylphenidate ER 20 mg tablets.  We had him on capsules which just made him feel too jittery.  He was seen by Dr. Nice back in September who transitioned him over to these tablets.  Patient finds that the tablets worked very well.  He is able to keep his concentration and focus without feeling borderline anxious and jittery.  He would like to continue with this medication long-term.  Obtained and reviewed patient utilization report from [facility] pharmacy database on 1/14/2019 4:44 PM  prior to writing prescription for controlled substance II, III or IV per [place] bill . Based on assessment of the report,my physical exam if necessary and the patient's health problem, the prescription is medically necessary.       Chronic fatigue  This is a chronic health problem that has improved getting his ADHD under better control.  Patient will continue with current medications and we will resolve this issue      Patient Active Problem List    Diagnosis Date Noted   • Chronic fatigue 07/25/2018   • Well adult exam 01/19/2018   • Attention deficit hyperactivity disorder (ADHD), predominantly inattentive type 01/19/2018      Allergies:Cillins [penicillins]    Current Outpatient Prescriptions   Medication Sig Dispense Refill   • [START ON 3/15/2019] methylphenidate (METADATE ER) 20 MG CR tablet Take 1 Tab by mouth 2 Times a Day for 30  "days. 60 Tab 0     No current facility-administered medications for this visit.        Social History   Substance Use Topics   • Smoking status: Never Smoker   • Smokeless tobacco: Never Used   • Alcohol use 1.2 oz/week     2 Cans of beer per week     Social History     Social History Narrative   • No narrative on file       Family History   Problem Relation Age of Onset   • No Known Problems Mother    • No Known Problems Father    • Cancer Sister         thyroid        ROS:     - Constitutional:  Negative for fever, chills, unexpected weight change, and fatigue/generalized weakness.    - HEENT:  Negative for headaches, vision changes, hearing changes, ear pain, ear discharge, rhinorrhea, sinus congestion, sore throat, and neck pain.      - Respiratory: Negative for cough, sputum production, chest congestion, dyspnea, wheezing, and crackles.      - Cardiovascular: Negative for chest pain, palpitations, orthopnea, and bilateral lower extremity edema.     - Gastrointestinal: Negative for heartburn, nausea, vomiting, abdominal pain, hematochezia, melena, diarrhea, constipation, and greasy/foul-smelling stools.     - Genitourinary: Negative for dysuria, polyuria, hematuria, pyuria, urinary urgency, and urinary incontinence.     - Musculoskeletal: Negative for myalgias, back pain, and joint pain.     - Skin: Negative for rash, itching, cyanotic skin color change.     - Neurological: Negative for dizziness, tingling, tremors, focal sensory deficit, focal weakness and headaches.     - Endo/Heme/Allergies: Does not bruise/bleed easily.     - Psychiatric/Behavioral: Negative for depression, suicidal/homicidal ideation and memory loss.          - NOTE: All other systems reviewed and are negative, except as in HPI.      Exam:    Blood pressure 126/70, pulse 71, temperature 36.8 °C (98.2 °F), temperature source Temporal, resp. rate 14, height 1.74 m (5' 8.5\"), weight 87.8 kg (193 lb 9 oz), SpO2 96 %. Body mass index is 29 " kg/m².    General:  Well nourished, well developed male in NAD  Head is grossly normal.  Neck: Supple without JVD or bruit. Thyroid is not enlarged.  Pulmonary: Clear to ausculation and percussion.  Normal effort. No rales, ronchi, or wheezing.  Cardiovascular: Regular rate and rhythm without murmur. Carotid and radial pulses are intact and equal bilaterally.  Extremities: no clubbing, cyanosis, or edema.  Patient was seen for 25 minutes face to face of which, 20 minutes was spent counseling regarding current medications interactions and side effects, follow-up for this medication and a discussion of  and why asked to be seen every 90 days..    Please note that this dictation was created using voice recognition software. I have made every reasonable attempt to correct obvious errors, but I expect that there are errors of grammar and possibly content that I did not discover before finalizing the note.    Assessment/Plan:  1. Attention deficit hyperactivity disorder (ADHD), predominantly inattentive type  Controlled, continue with current meds and lifestyle.    - methylphenidate (METADATE ER) 20 MG CR tablet; Take 1 Tab by mouth 2 Times a Day for 30 days.  Dispense: 60 Tab; Refill: 0    2. Chronic fatigue  Controlled, continue with current meds and lifestyle.

## 2019-02-15 ENCOUNTER — HOSPITAL ENCOUNTER (OUTPATIENT)
Dept: LAB | Facility: MEDICAL CENTER | Age: 39
End: 2019-02-15
Attending: FAMILY MEDICINE
Payer: COMMERCIAL

## 2019-02-15 DIAGNOSIS — Z00.00 ANNUAL PHYSICAL EXAM: ICD-10-CM

## 2019-02-15 LAB
BASOPHILS # BLD AUTO: 0.8 % (ref 0–1.8)
BASOPHILS # BLD: 0.05 K/UL (ref 0–0.12)
EOSINOPHIL # BLD AUTO: 0.26 K/UL (ref 0–0.51)
EOSINOPHIL NFR BLD: 4.4 % (ref 0–6.9)
ERYTHROCYTE [DISTWIDTH] IN BLOOD BY AUTOMATED COUNT: 41.1 FL (ref 35.9–50)
HCT VFR BLD AUTO: 51.3 % (ref 42–52)
HGB BLD-MCNC: 17 G/DL (ref 14–18)
IMM GRANULOCYTES # BLD AUTO: 0.01 K/UL (ref 0–0.11)
IMM GRANULOCYTES NFR BLD AUTO: 0.2 % (ref 0–0.9)
LYMPHOCYTES # BLD AUTO: 1.85 K/UL (ref 1–4.8)
LYMPHOCYTES NFR BLD: 31.3 % (ref 22–41)
MCH RBC QN AUTO: 31 PG (ref 27–33)
MCHC RBC AUTO-ENTMCNC: 33.1 G/DL (ref 33.7–35.3)
MCV RBC AUTO: 93.4 FL (ref 81.4–97.8)
MONOCYTES # BLD AUTO: 0.48 K/UL (ref 0–0.85)
MONOCYTES NFR BLD AUTO: 8.1 % (ref 0–13.4)
NEUTROPHILS # BLD AUTO: 3.27 K/UL (ref 1.82–7.42)
NEUTROPHILS NFR BLD: 55.2 % (ref 44–72)
NRBC # BLD AUTO: 0 K/UL
NRBC BLD-RTO: 0 /100 WBC
PLATELET # BLD AUTO: 217 K/UL (ref 164–446)
PMV BLD AUTO: 9.8 FL (ref 9–12.9)
RBC # BLD AUTO: 5.49 M/UL (ref 4.7–6.1)
WBC # BLD AUTO: 5.9 K/UL (ref 4.8–10.8)

## 2019-02-15 PROCEDURE — 80061 LIPID PANEL: CPT

## 2019-02-15 PROCEDURE — 85025 COMPLETE CBC W/AUTO DIFF WBC: CPT

## 2019-02-15 PROCEDURE — 80053 COMPREHEN METABOLIC PANEL: CPT

## 2019-02-15 PROCEDURE — 36415 COLL VENOUS BLD VENIPUNCTURE: CPT

## 2019-02-15 PROCEDURE — 84443 ASSAY THYROID STIM HORMONE: CPT

## 2019-02-16 LAB
ALBUMIN SERPL BCP-MCNC: 4.8 G/DL (ref 3.2–4.9)
ALBUMIN/GLOB SERPL: 1.8 G/DL
ALP SERPL-CCNC: 42 U/L (ref 30–99)
ALT SERPL-CCNC: 16 U/L (ref 2–50)
ANION GAP SERPL CALC-SCNC: 5 MMOL/L (ref 0–11.9)
AST SERPL-CCNC: 19 U/L (ref 12–45)
BILIRUB SERPL-MCNC: 0.5 MG/DL (ref 0.1–1.5)
BUN SERPL-MCNC: 20 MG/DL (ref 8–22)
CALCIUM SERPL-MCNC: 10.3 MG/DL (ref 8.5–10.5)
CHLORIDE SERPL-SCNC: 104 MMOL/L (ref 96–112)
CHOLEST SERPL-MCNC: 203 MG/DL (ref 100–199)
CO2 SERPL-SCNC: 29 MMOL/L (ref 20–33)
CREAT SERPL-MCNC: 0.99 MG/DL (ref 0.5–1.4)
FASTING STATUS PATIENT QL REPORTED: NORMAL
GLOBULIN SER CALC-MCNC: 2.6 G/DL (ref 1.9–3.5)
GLUCOSE SERPL-MCNC: 89 MG/DL (ref 65–99)
HDLC SERPL-MCNC: 69 MG/DL
LDLC SERPL CALC-MCNC: 120 MG/DL
POTASSIUM SERPL-SCNC: 4.1 MMOL/L (ref 3.6–5.5)
PROT SERPL-MCNC: 7.4 G/DL (ref 6–8.2)
SODIUM SERPL-SCNC: 138 MMOL/L (ref 135–145)
TRIGL SERPL-MCNC: 69 MG/DL (ref 0–149)
TSH SERPL DL<=0.005 MIU/L-ACNC: 1.05 UIU/ML (ref 0.38–5.33)

## 2019-02-19 ENCOUNTER — TELEPHONE (OUTPATIENT)
Dept: MEDICAL GROUP | Facility: MEDICAL CENTER | Age: 39
End: 2019-02-19

## 2019-02-19 NOTE — TELEPHONE ENCOUNTER
----- Message from Homar Nice M.D. sent at 2/19/2019  7:39 AM PST -----  Please notify patient that his kidney function, liver function, blood sugar, thyroid function, blood counts are normal.  Cholesterol is slightly elevated. We advise to reduce sugar/carbohydrate/alcohol, eat more vegetables and lean meats such as fish/chicken/turkey. We also recommend 30 minutes of cardiovascular exercise most days of the week.    Homar Nice M.D.

## 2019-03-24 ENCOUNTER — OFFICE VISIT (OUTPATIENT)
Dept: URGENT CARE | Facility: CLINIC | Age: 39
End: 2019-03-24
Payer: COMMERCIAL

## 2019-03-24 VITALS
RESPIRATION RATE: 14 BRPM | BODY MASS INDEX: 26.66 KG/M2 | HEART RATE: 78 BPM | HEIGHT: 69 IN | TEMPERATURE: 97.5 F | WEIGHT: 180 LBS | SYSTOLIC BLOOD PRESSURE: 116 MMHG | DIASTOLIC BLOOD PRESSURE: 72 MMHG | OXYGEN SATURATION: 98 %

## 2019-03-24 DIAGNOSIS — J02.0 STREP THROAT: ICD-10-CM

## 2019-03-24 DIAGNOSIS — J02.9 PHARYNGITIS, UNSPECIFIED ETIOLOGY: ICD-10-CM

## 2019-03-24 LAB
INT CON NEG: NEGATIVE
INT CON POS: POSITIVE
S PYO AG THROAT QL: NORMAL

## 2019-03-24 PROCEDURE — 99214 OFFICE O/P EST MOD 30 MIN: CPT | Performed by: NURSE PRACTITIONER

## 2019-03-24 PROCEDURE — 87880 STREP A ASSAY W/OPTIC: CPT | Performed by: NURSE PRACTITIONER

## 2019-03-24 RX ORDER — AZITHROMYCIN 250 MG/1
TABLET, FILM COATED ORAL
Qty: 6 TAB | Refills: 0 | Status: SHIPPED | OUTPATIENT
Start: 2019-03-24 | End: 2019-04-08

## 2019-03-24 ASSESSMENT — ENCOUNTER SYMPTOMS
SORE THROAT: 1
SWOLLEN GLANDS: 1
TROUBLE SWALLOWING: 1

## 2019-03-24 NOTE — PROGRESS NOTES
"Subjective:      Xander Saldivar is a 38 y.o. male who presents with Pharyngitis (x 2 days)    Past Medical History:   Diagnosis Date   • Attention deficit hyperactivity disorder (ADHD), predominantly inattentive type 1/19/2018   • Chronic fatigue 7/25/2018   • Left upper quadrant abdominal mass 7/25/2018   • Well adult exam 1/19/2018     Social History     Social History   • Marital status:      Spouse name: N/A   • Number of children: N/A   • Years of education: N/A     Occupational History   • Not on file.     Social History Main Topics   • Smoking status: Never Smoker   • Smokeless tobacco: Never Used   • Alcohol use 1.2 oz/week     2 Cans of beer per week   • Drug use: No   • Sexual activity: Yes     Partners: Female      Comment: , [employer] rep     Other Topics Concern   • Not on file     Social History Narrative   • No narrative on file     Mr. Saldivar had no medications administered during this visit.    Allergies: Cillins [penicillins]    Patient is a 38-year-old male who presents today with complaint of sore throat.  Symptoms started over the last week.  Patient states he does have prior history of strep throat and states his symptoms are similar to what he has had before.  He denies any fever, aches, or chills.          Pharyngitis    This is a new problem. The current episode started in the past 7 days. The problem has been unchanged. Neither side of throat is experiencing more pain than the other. There has been no fever. Associated symptoms include swollen glands and trouble swallowing. He has tried nothing for the symptoms. The treatment provided no relief.       Review of Systems   Constitutional: Positive for malaise/fatigue.   HENT: Positive for sore throat and trouble swallowing.    Skin: Negative.    All other systems reviewed and are negative.         Objective:     /72   Pulse 78   Temp 36.4 °C (97.5 °F)   Resp 14   Ht 1.753 m (5' 9\")   Wt 81.6 kg (180 lb)   SpO2 " 98%   BMI 26.58 kg/m²      Physical Exam   Constitutional: He is oriented to person, place, and time. He appears well-developed and well-nourished.   HENT:   Head: Normocephalic.   Right Ear: External ear normal.   Left Ear: External ear normal.   Oropharynx is red, no exudate.   Eyes: Pupils are equal, round, and reactive to light. Conjunctivae and EOM are normal.   Neck: Normal range of motion. Neck supple.   Cardiovascular: Normal rate, regular rhythm and normal heart sounds.    No murmur heard.  Pulmonary/Chest: Effort normal and breath sounds normal. No respiratory distress.   Musculoskeletal: Normal range of motion.   Neurological: He is alert and oriented to person, place, and time.   Skin: Skin is warm and dry. Capillary refill takes less than 2 seconds.   Psychiatric: He has a normal mood and affect. His behavior is normal.   Vitals reviewed.    poct strep: positive           Assessment/Plan:   Pharyngitis  Strep throat    zithromax  Warm saltwater gargles  Tylenol/motrin PRN  Push fluids  Follow up for persistent or worseining of symptoms.  There are no diagnoses linked to this encounter.

## 2019-04-08 ENCOUNTER — OFFICE VISIT (OUTPATIENT)
Dept: MEDICAL GROUP | Facility: MEDICAL CENTER | Age: 39
End: 2019-04-08
Payer: COMMERCIAL

## 2019-04-08 VITALS
SYSTOLIC BLOOD PRESSURE: 126 MMHG | RESPIRATION RATE: 14 BRPM | TEMPERATURE: 98.2 F | OXYGEN SATURATION: 96 % | HEART RATE: 75 BPM | WEIGHT: 196.21 LBS | DIASTOLIC BLOOD PRESSURE: 72 MMHG | HEIGHT: 69 IN | BODY MASS INDEX: 29.06 KG/M2

## 2019-04-08 DIAGNOSIS — F90.0 ATTENTION DEFICIT HYPERACTIVITY DISORDER (ADHD), PREDOMINANTLY INATTENTIVE TYPE: ICD-10-CM

## 2019-04-08 PROCEDURE — 99214 OFFICE O/P EST MOD 30 MIN: CPT | Performed by: FAMILY MEDICINE

## 2019-04-08 RX ORDER — METHYLPHENIDATE HYDROCHLORIDE EXTENDED RELEASE 20 MG/1
20 TABLET ORAL 2 TIMES DAILY
Qty: 60 TAB | Refills: 0 | Status: SHIPPED | OUTPATIENT
Start: 2019-06-07 | End: 2019-07-01 | Stop reason: SDUPTHER

## 2019-04-08 RX ORDER — METHYLPHENIDATE HYDROCHLORIDE EXTENDED RELEASE 20 MG/1
20 TABLET ORAL 2 TIMES DAILY
Qty: 60 TAB | Refills: 0 | Status: SHIPPED | OUTPATIENT
Start: 2019-04-08 | End: 2019-04-08 | Stop reason: SDUPTHER

## 2019-04-08 RX ORDER — METHYLPHENIDATE HYDROCHLORIDE EXTENDED RELEASE 20 MG/1
20 TABLET ORAL 2 TIMES DAILY
Qty: 60 TAB | Refills: 0 | Status: SHIPPED | OUTPATIENT
Start: 2019-05-08 | End: 2019-04-08 | Stop reason: SDUPTHER

## 2019-04-08 NOTE — ASSESSMENT & PLAN NOTE
This is a chronic health problem for this patient adequately controlled with Metadate ER 20 mg twice a day.  Patient finds that his concentration is greatly improved with this medication.  We will have him continue it long-term.  He is here today to get 3 each 30-day prescriptions.  Obtained and reviewed patient utilization report from [facility] pharmacy database on 4/8/2019 8:29 AM  prior to writing prescription for controlled substance II, III or IV per [place] bill . Based on assessment of the report,my physical exam if necessary and the patient's health problem, the prescription is medically necessary.

## 2019-04-08 NOTE — PROGRESS NOTES
CC:The encounter diagnosis was Attention deficit hyperactivity disorder (ADHD), predominantly inattentive type.    HISTORY OF PRESENT ILLNESS: Patient is a 38 y.o. male established patient who presents today to follow-up on ADH D medications.  Patient states that he is doing very well.  He informs me that he and his wife have been able to achieve a pregnancy and she is due to deliver a daughter on 7/14/19.    Health Maintenance: Completed    Attention deficit hyperactivity disorder (ADHD), predominantly inattentive type  This is a chronic health problem for this patient adequately controlled with Metadate ER 20 mg twice a day.  Patient finds that his concentration is greatly improved with this medication.  We will have him continue it long-term.  He is here today to get 3 each 30-day prescriptions.  Obtained and reviewed patient utilization report from [facility] pharmacy database on 4/8/2019 8:29 AM  prior to writing prescription for controlled substance II, III or IV per [place] bill . Based on assessment of the report,my physical exam if necessary and the patient's health problem, the prescription is medically necessary.         Patient Active Problem List    Diagnosis Date Noted   • Chronic fatigue 07/25/2018   • Well adult exam 01/19/2018   • Attention deficit hyperactivity disorder (ADHD), predominantly inattentive type 01/19/2018      Allergies:Cillins [penicillins]    Current Outpatient Prescriptions   Medication Sig Dispense Refill   • [START ON 6/7/2019] methylphenidate (METADATE ER) 20 MG CR tablet Take 1 Tab by mouth 2 Times a Day for 30 days. 60 Tab 0     No current facility-administered medications for this visit.        Social History   Substance Use Topics   • Smoking status: Never Smoker   • Smokeless tobacco: Never Used   • Alcohol use 1.2 oz/week     2 Cans of beer per week     Social History     Social History Narrative   • No narrative on file       Family History   Problem Relation Age of  "Onset   • No Known Problems Mother    • No Known Problems Father    • Cancer Sister         thyroid        ROS:     - Constitutional:  Negative for fever, chills, unexpected weight change, and fatigue/generalized weakness.    - HEENT:  Negative for headaches, vision changes, hearing changes, ear pain, ear discharge, rhinorrhea, sinus congestion, sore throat, and neck pain.      - Respiratory: Negative for cough, sputum production, chest congestion, dyspnea, wheezing, and crackles.      - Cardiovascular: Negative for chest pain, palpitations, orthopnea, and bilateral lower extremity edema.     - Gastrointestinal: Negative for heartburn, nausea, vomiting, abdominal pain, hematochezia, melena, diarrhea, constipation, and greasy/foul-smelling stools.     - Genitourinary: Negative for dysuria, polyuria, hematuria, pyuria, urinary urgency, and urinary incontinence.     - Musculoskeletal: Negative for myalgias, back pain, and joint pain.     - Skin: Negative for rash, itching, cyanotic skin color change.     - Neurological: Negative for dizziness, tingling, tremors, focal sensory deficit, focal weakness and headaches.     - Endo/Heme/Allergies: Does not bruise/bleed easily.     - Psychiatric/Behavioral: Negative for depression, suicidal/homicidal ideation and memory loss.          - NOTE: All other systems reviewed and are negative, except as in HPI.      Exam:    /72 (BP Location: Left arm, Patient Position: Sitting, BP Cuff Size: Adult)   Pulse 75   Temp 36.8 °C (98.2 °F) (Temporal)   Resp 14   Ht 1.753 m (5' 9\")   Wt 89 kg (196 lb 3.4 oz)   SpO2 96%  Body mass index is 28.98 kg/m².    General:  Well nourished, well developed male in NAD  Head is grossly normal.    Patient was seen for 25 minutes face to face of which, 20 minutes was spent counseling regarding Medications interactions and side effects.      Please note that this dictation was created using voice recognition software. I have made every " reasonable attempt to correct obvious errors, but I expect that there are errors of grammar and possibly content that I did not discover before finalizing the note.    Assessment/Plan:  1. Attention deficit hyperactivity disorder (ADHD), predominantly inattentive type  Well-controlled with current medication.  According to the  report for [place] this is the only controlled substances patient is taking.  He does not get refills early.  We will plan to see him back in 3 months when it will be time to provide more.  - methylphenidate (METADATE ER) 20 MG CR tablet; Take 1 Tab by mouth 2 Times a Day for 30 days.  Dispense: 60 Tab; Refill: 0

## 2019-06-29 ENCOUNTER — OFFICE VISIT (OUTPATIENT)
Dept: URGENT CARE | Facility: CLINIC | Age: 39
End: 2019-06-29
Payer: COMMERCIAL

## 2019-06-29 VITALS
HEIGHT: 69 IN | TEMPERATURE: 98.6 F | BODY MASS INDEX: 27.4 KG/M2 | RESPIRATION RATE: 12 BRPM | DIASTOLIC BLOOD PRESSURE: 82 MMHG | WEIGHT: 185 LBS | HEART RATE: 68 BPM | OXYGEN SATURATION: 96 % | SYSTOLIC BLOOD PRESSURE: 118 MMHG

## 2019-06-29 DIAGNOSIS — J02.9 SORE THROAT: ICD-10-CM

## 2019-06-29 DIAGNOSIS — J02.9 ACUTE PHARYNGITIS, UNSPECIFIED ETIOLOGY: ICD-10-CM

## 2019-06-29 LAB
INT CON NEG: NEGATIVE
INT CON POS: POSITIVE
S PYO AG THROAT QL: NEGATIVE

## 2019-06-29 PROCEDURE — 99214 OFFICE O/P EST MOD 30 MIN: CPT | Performed by: NURSE PRACTITIONER

## 2019-06-29 PROCEDURE — 87880 STREP A ASSAY W/OPTIC: CPT | Performed by: NURSE PRACTITIONER

## 2019-06-29 RX ORDER — AZITHROMYCIN 250 MG/1
TABLET, FILM COATED ORAL
Qty: 6 TAB | Refills: 0 | Status: SHIPPED | OUTPATIENT
Start: 2019-06-29 | End: 2019-07-01

## 2019-06-29 ASSESSMENT — ENCOUNTER SYMPTOMS
SWOLLEN GLANDS: 1
SORE THROAT: 1

## 2019-06-29 ASSESSMENT — PAIN SCALES - GENERAL: PAINLEVEL: 7=MODERATE-SEVERE PAIN

## 2019-06-29 NOTE — PROGRESS NOTES
"Subjective:      Xander Saldivar is a 39 y.o. male who presents with Sore Throat (x2 days) and Headache            Pharyngitis    This is a new problem. Episode onset: pt reports new onset of ST that started yesterday. He also notes body aches and headache. He reports his throat feels similar to the strep infection he had 3 months ago. The problem has been unchanged. Neither side of throat is experiencing more pain than the other. There has been no fever. Associated symptoms include swollen glands. He has tried nothing for the symptoms.       Review of Systems   HENT: Positive for sore throat.    All other systems reviewed and are negative.    Past Medical History:   Diagnosis Date   • Attention deficit hyperactivity disorder (ADHD), predominantly inattentive type 1/19/2018   • Chronic fatigue 7/25/2018   • Left upper quadrant abdominal mass 7/25/2018   • Well adult exam 1/19/2018      Past Surgical History:   Procedure Laterality Date   • ACL RECONSTRUCTION SCOPE Right    • DENTAL EXTRACTION(S)        Social History     Social History   • Marital status:      Spouse name: N/A   • Number of children: N/A   • Years of education: N/A     Occupational History   • Not on file.     Social History Main Topics   • Smoking status: Never Smoker   • Smokeless tobacco: Never Used   • Alcohol use 1.2 oz/week     2 Cans of beer per week   • Drug use: No   • Sexual activity: Yes     Partners: Female      Comment: , [employer] rep     Other Topics Concern   • Not on file     Social History Narrative   • No narrative on file          Objective:     /82   Pulse 68   Temp 37 °C (98.6 °F) (Temporal)   Resp 12   Ht 1.753 m (5' 9\")   Wt 83.9 kg (185 lb)   SpO2 96%   BMI 27.32 kg/m²      Physical Exam   Constitutional: He is oriented to person, place, and time. Vital signs are normal. He appears well-developed and well-nourished.   HENT:   Head: Normocephalic and atraumatic.   Right Ear: Tympanic membrane " and external ear normal.   Left Ear: Tympanic membrane and external ear normal.   Nose: Nose normal.   Mouth/Throat: Posterior oropharyngeal erythema present.   Eyes: Pupils are equal, round, and reactive to light. EOM are normal.   Neck: Normal range of motion.   Cardiovascular: Normal rate and regular rhythm.    Pulmonary/Chest: Effort normal.   Musculoskeletal: Normal range of motion.   Neurological: He is alert and oriented to person, place, and time.   Skin: Skin is warm and dry. Capillary refill takes less than 2 seconds.   Psychiatric: He has a normal mood and affect. His speech is normal and behavior is normal. Thought content normal.   Vitals reviewed.              Assessment/Plan:     1. Sore throat  - POCT Rapid Strep A NEGATIVE    2. Acute pharyngitis, unspecified etiology  - azithromycin (ZITHROMAX) 250 MG Tab; Take as directed  Dispense: 6 Tab; Refill: 0    Contingent antibiotic prescription given to patient to fill upon meeting criteria of guidelines discussed.   Encouraged pt to use tylenol, ibuprofen, throat lozenges and warm salt water gargles to help improve pain.  He is worried because his daughter is due to be born anyday and does not want to be sick when she arrives  Discussed with patient how to reduce spread of illness, hand washing reviewed  Supportive care, differential diagnoses, and indications for immediate follow-up discussed with patient.    Pathogenesis of diagnosis discussed including typical length and natural progression.      Instructed to return to  or nearest emergency department if symptoms fail to improve, for any change in condition, further concerns, or new concerning symptoms.  Patient states understanding of the plan of care and discharge instructions.

## 2019-07-01 ENCOUNTER — APPOINTMENT (OUTPATIENT)
Dept: MEDICAL GROUP | Facility: MEDICAL CENTER | Age: 39
End: 2019-07-01
Payer: COMMERCIAL

## 2019-07-01 ENCOUNTER — OFFICE VISIT (OUTPATIENT)
Dept: MEDICAL GROUP | Facility: MEDICAL CENTER | Age: 39
End: 2019-07-01
Payer: COMMERCIAL

## 2019-07-01 VITALS
TEMPERATURE: 98.9 F | DIASTOLIC BLOOD PRESSURE: 70 MMHG | HEART RATE: 76 BPM | HEIGHT: 69 IN | SYSTOLIC BLOOD PRESSURE: 110 MMHG | BODY MASS INDEX: 29.78 KG/M2 | OXYGEN SATURATION: 98 % | WEIGHT: 201.06 LBS

## 2019-07-01 DIAGNOSIS — R53.82 CHRONIC FATIGUE: ICD-10-CM

## 2019-07-01 DIAGNOSIS — M25.449 SWELLING OF JOINT OF HAND, UNSPECIFIED LATERALITY: ICD-10-CM

## 2019-07-01 DIAGNOSIS — F90.0 ATTENTION DEFICIT HYPERACTIVITY DISORDER (ADHD), PREDOMINANTLY INATTENTIVE TYPE: ICD-10-CM

## 2019-07-01 PROCEDURE — 99214 OFFICE O/P EST MOD 30 MIN: CPT | Performed by: FAMILY MEDICINE

## 2019-07-01 RX ORDER — CEFDINIR 300 MG/1
600 CAPSULE ORAL DAILY
Qty: 20 CAP | Refills: 0 | Status: SHIPPED | OUTPATIENT
Start: 2019-07-01 | End: 2019-07-11

## 2019-07-01 RX ORDER — METHYLPHENIDATE HYDROCHLORIDE EXTENDED RELEASE 20 MG/1
20 TABLET ORAL 2 TIMES DAILY
Qty: 60 TAB | Refills: 0 | Status: SHIPPED | OUTPATIENT
Start: 2019-09-04 | End: 2019-10-04 | Stop reason: SDUPTHER

## 2019-07-01 RX ORDER — METHYLPHENIDATE HYDROCHLORIDE EXTENDED RELEASE 20 MG/1
20 TABLET ORAL 2 TIMES DAILY
Qty: 60 TAB | Refills: 0 | Status: SHIPPED | OUTPATIENT
Start: 2019-07-05 | End: 2019-07-01 | Stop reason: SDUPTHER

## 2019-07-01 RX ORDER — VALACYCLOVIR HYDROCHLORIDE 1 G/1
TABLET, FILM COATED ORAL
Refills: 9 | COMMUNITY
Start: 2019-06-24 | End: 2020-06-25

## 2019-07-01 RX ORDER — METHYLPHENIDATE HYDROCHLORIDE EXTENDED RELEASE 20 MG/1
20 TABLET ORAL 2 TIMES DAILY
Qty: 60 TAB | Refills: 0 | Status: SHIPPED | OUTPATIENT
Start: 2019-08-05 | End: 2019-07-01 | Stop reason: SDUPTHER

## 2019-07-01 NOTE — ASSESSMENT & PLAN NOTE
This is a chronic health problem for this patient for which he uses Metadate 20 mg twice a day which works well for him.  It is time for his refills.  We will write that today for a 90-day supply.  Obtained and reviewed patient utilization report from [facility] pharmacy database on 7/1/2019 10:49 AM  prior to writing prescription for controlled substance II, III or IV per [place] bill . Based on assessment of the report,my physical exam if necessary and the patient's health problem, the prescription is medically necessary.

## 2019-07-01 NOTE — ASSESSMENT & PLAN NOTE
Patient has noted worsening fatigue, joint pain and swelling of his hands and feet and weight fluctuations of 10 to 15 pounds at a time.  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

## 2019-07-01 NOTE — PROGRESS NOTES
CC:Diagnoses of Attention deficit hyperactivity disorder (ADHD), predominantly inattentive type, Chronic fatigue, and Swelling of joint of hand, unspecified laterality were pertinent to this visit.    HISTORY OF PRESENT ILLNESS: Patient is a 39 y.o. male established patient who presents today to talk about how his health has been changing over the last 3-4 months.  Patient has noted that he has had ongoing fatigue without clear etiology.  He feels as if he is gotten a strep throat about every 3 months for the last year.  He was recently seen in the urgent care and told that he had strep throat with for which she is now on Zithromax.  I am going to give him a prescription for Omnicef just in case his Zithromax is not adequate.  Patient is also noting swelling of his hands and feet which has been going on for at least 3 to 4 months.  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  Unclear etiology of what is going on.    Health Maintenance: Completed    Attention deficit hyperactivity disorder (ADHD), predominantly inattentive type  This is a chronic health problem for this patient for which he uses Metadate 20 mg twice a day which works well for him.  It is time for his refills.  We will write that today for a 90-day supply.  Obtained and reviewed patient utilization report from [facility] pharmacy database on 7/1/2019 10:49 AM  prior to writing prescription for controlled substance II, III or IV per [place] bill . Based on assessment of the report,my physical exam if necessary and the patient's health problem, the prescription is medically necessary.       Chronic fatigue  Patient has noted worsening fatigue, joint pain and swelling of his hands and feet and weight fluctuations of 10 to 15 pounds at a time.  He also finds that his feet are swollen on a nearly daily basis but particularly after he flies  cross-country.    Swelling of hand joint  This is a chronic problem that is been ongoing for about 3 to 4 months.  He finds that he is having swelling both in his feet and hands.  He also has erythema and joint pain associated with this.  We are going to look for autoimmune diseases.  This patient does not have a full family history so difficult to know if there is autoimmune disease in his background.      Patient Active Problem List    Diagnosis Date Noted   • Swelling of hand joint 07/01/2019   • Chronic fatigue 07/25/2018   • Well adult exam 01/19/2018   • Attention deficit hyperactivity disorder (ADHD), predominantly inattentive type 01/19/2018      Allergies:Cillins [penicillins]    Current Outpatient Prescriptions   Medication Sig Dispense Refill   • [START ON 9/4/2019] methylphenidate (METADATE ER) 20 MG CR tablet Take 1 Tab by mouth 2 Times a Day for 30 days. 60 Tab 0   • cefdinir (OMNICEF) 300 MG Cap Take 2 Caps by mouth every day for 10 days. 20 Cap 0   • valacyclovir (VALTREX) 1 GM Tab   9     No current facility-administered medications for this visit.        Social History   Substance Use Topics   • Smoking status: Never Smoker   • Smokeless tobacco: Never Used   • Alcohol use 1.2 oz/week     2 Cans of beer per week     Social History     Social History Narrative   • No narrative on file       Family History   Problem Relation Age of Onset   • No Known Problems Mother    • No Known Problems Father    • Cancer Sister         thyroid        ROS:     - Constitutional: Positive for fatigue malaise but denies fevers chills.    - HEENT:  Negative for headaches, vision changes, hearing changes, ear pain, ear discharge, rhinorrhea, sinus congestion, sore throat, and neck pain.      - Respiratory: Negative for cough, sputum production, chest congestion, dyspnea, wheezing, and crackles.      - Cardiovascular: Negative for chest pain, palpitations, orthopnea, and bilateral lower extremity edema.     -  "Gastrointestinal: Negative for heartburn, nausea, vomiting, abdominal pain, hematochezia, melena, diarrhea, constipation, and greasy/foul-smelling stools.     - Genitourinary: Negative for dysuria, polyuria, hematuria, pyuria, urinary urgency, and urinary incontinence.     - Musculoskeletal: Patient does note myalgias and arthralgias in his hands and feet bilaterally.     - Skin: Negative for rash, itching, cyanotic skin color change.     - Neurological: Negative for dizziness, tingling, tremors, focal sensory deficit, focal weakness and headaches.     - Endo/Heme/Allergies: Does not bruise/bleed easily.     - Psychiatric/Behavioral: Negative for depression, suicidal/homicidal ideation and memory loss.          - NOTE: All other systems reviewed and are negative, except as in HPI.      Exam:    /70 (BP Location: Left arm, Patient Position: Sitting, BP Cuff Size: Large adult)   Pulse 76   Temp 37.2 °C (98.9 °F) (Temporal)   Ht 1.753 m (5' 9\")   Wt 91.2 kg (201 lb 1 oz)   SpO2 98%  Body mass index is 29.69 kg/m².    General:  Well nourished, well developed male in NAD, patient does have a generally bloated appearance along with generalized swelling of his hands and feet.  Head is grossly normal.  Neck: Supple without JVD or bruit. Thyroid is not enlarged.  Pulmonary: Clear to ausculation and percussion.  Normal effort. No rales, ronchi, or wheezing.  Cardiovascular: Regular rate and rhythm without murmur. Carotid and radial pulses are intact and equal bilaterally.    Please note that this dictation was created using voice recognition software. I have made every reasonable attempt to correct obvious errors, but I expect that there are errors of grammar and possibly content that I did not discover before finalizing the note.    Assessment/Plan:  1. Attention deficit hyperactivity disorder (ADHD), predominantly inattentive type  We will renew the patient's prescription as is appropriate and see him back in 90 " days.  - methylphenidate (METADATE ER) 20 MG CR tablet; Take 1 Tab by mouth 2 Times a Day for 30 days.  Dispense: 60 Tab; Refill: 0    2. Chronic fatigue  Unclear etiology I want to order testing to see if we can identify an etiology for this.  Patient just had thyroid testing earlier this year and it was normal.    3. Swelling of joint of hand, unspecified laterality  Uncontrolled, we will get blood work and then notify him of results and next steps.  - Comp Metabolic Panel; Future  - CBC WITH DIFFERENTIAL; Future  - URIC A+RITESH+RA QN+CRP+ASO  - WESTERGREN SED RATE; Future

## 2019-07-01 NOTE — ASSESSMENT & PLAN NOTE
This is a chronic problem that is been ongoing for about 3 to 4 months.  He finds that he is having swelling both in his feet and hands.  He also has erythema and joint pain associated with this.  We are going to look for autoimmune diseases.  This patient does not have a full family history so difficult to know if there is autoimmune disease in his background.

## 2019-07-02 ENCOUNTER — HOSPITAL ENCOUNTER (OUTPATIENT)
Dept: LAB | Facility: MEDICAL CENTER | Age: 39
End: 2019-07-02
Attending: FAMILY MEDICINE
Payer: COMMERCIAL

## 2019-07-02 DIAGNOSIS — M25.449 SWELLING OF JOINT OF HAND, UNSPECIFIED LATERALITY: ICD-10-CM

## 2019-07-02 LAB
ALBUMIN SERPL BCP-MCNC: 4.5 G/DL (ref 3.2–4.9)
ALBUMIN/GLOB SERPL: 1.7 G/DL
ALP SERPL-CCNC: 51 U/L (ref 30–99)
ALT SERPL-CCNC: 35 U/L (ref 2–50)
ANION GAP SERPL CALC-SCNC: 8 MMOL/L (ref 0–11.9)
AST SERPL-CCNC: 31 U/L (ref 12–45)
BASOPHILS # BLD AUTO: 1.3 % (ref 0–1.8)
BASOPHILS # BLD: 0.07 K/UL (ref 0–0.12)
BILIRUB SERPL-MCNC: 0.4 MG/DL (ref 0.1–1.5)
BUN SERPL-MCNC: 22 MG/DL (ref 8–22)
CALCIUM SERPL-MCNC: 9.6 MG/DL (ref 8.5–10.5)
CHLORIDE SERPL-SCNC: 104 MMOL/L (ref 96–112)
CO2 SERPL-SCNC: 28 MMOL/L (ref 20–33)
CREAT SERPL-MCNC: 0.98 MG/DL (ref 0.5–1.4)
CRP SERPL HS-MCNC: 0.25 MG/DL (ref 0–0.75)
EOSINOPHIL # BLD AUTO: 0.31 K/UL (ref 0–0.51)
EOSINOPHIL NFR BLD: 5.5 % (ref 0–6.9)
ERYTHROCYTE [DISTWIDTH] IN BLOOD BY AUTOMATED COUNT: 41.7 FL (ref 35.9–50)
ERYTHROCYTE [SEDIMENTATION RATE] IN BLOOD BY WESTERGREN METHOD: 4 MM/HOUR (ref 0–15)
FASTING STATUS PATIENT QL REPORTED: NORMAL
GLOBULIN SER CALC-MCNC: 2.7 G/DL (ref 1.9–3.5)
GLUCOSE SERPL-MCNC: 92 MG/DL (ref 65–99)
HCT VFR BLD AUTO: 50 % (ref 42–52)
HGB BLD-MCNC: 16.7 G/DL (ref 14–18)
IMM GRANULOCYTES # BLD AUTO: 0.02 K/UL (ref 0–0.11)
IMM GRANULOCYTES NFR BLD AUTO: 0.4 % (ref 0–0.9)
LYMPHOCYTES # BLD AUTO: 1.51 K/UL (ref 1–4.8)
LYMPHOCYTES NFR BLD: 27 % (ref 22–41)
MCH RBC QN AUTO: 30.7 PG (ref 27–33)
MCHC RBC AUTO-ENTMCNC: 33.4 G/DL (ref 33.7–35.3)
MCV RBC AUTO: 91.9 FL (ref 81.4–97.8)
MONOCYTES # BLD AUTO: 0.44 K/UL (ref 0–0.85)
MONOCYTES NFR BLD AUTO: 7.9 % (ref 0–13.4)
NEUTROPHILS # BLD AUTO: 3.25 K/UL (ref 1.82–7.42)
NEUTROPHILS NFR BLD: 57.9 % (ref 44–72)
NRBC # BLD AUTO: 0 K/UL
NRBC BLD-RTO: 0 /100 WBC
PLATELET # BLD AUTO: 206 K/UL (ref 164–446)
PMV BLD AUTO: 9.8 FL (ref 9–12.9)
POTASSIUM SERPL-SCNC: 4.1 MMOL/L (ref 3.6–5.5)
PROT SERPL-MCNC: 7.2 G/DL (ref 6–8.2)
RBC # BLD AUTO: 5.44 M/UL (ref 4.7–6.1)
RHEUMATOID FACT SER IA-ACNC: <10 IU/ML (ref 0–14)
SODIUM SERPL-SCNC: 140 MMOL/L (ref 135–145)
URATE SERPL-MCNC: 4.9 MG/DL (ref 2.5–8.3)
WBC # BLD AUTO: 5.6 K/UL (ref 4.8–10.8)

## 2019-07-02 PROCEDURE — 36415 COLL VENOUS BLD VENIPUNCTURE: CPT

## 2019-07-02 PROCEDURE — 86038 ANTINUCLEAR ANTIBODIES: CPT

## 2019-07-02 PROCEDURE — 86060 ANTISTREPTOLYSIN O TITER: CPT

## 2019-07-02 PROCEDURE — 86431 RHEUMATOID FACTOR QUANT: CPT

## 2019-07-02 PROCEDURE — 85652 RBC SED RATE AUTOMATED: CPT

## 2019-07-02 PROCEDURE — 86140 C-REACTIVE PROTEIN: CPT

## 2019-07-02 PROCEDURE — 85025 COMPLETE CBC W/AUTO DIFF WBC: CPT

## 2019-07-02 PROCEDURE — 84550 ASSAY OF BLOOD/URIC ACID: CPT

## 2019-07-02 PROCEDURE — 80053 COMPREHEN METABOLIC PANEL: CPT

## 2019-07-04 LAB
ASO AB SERPL-ACNC: 122 IU/ML (ref 0–330)
NUCLEAR IGG SER QL IA: NORMAL

## 2019-07-15 ENCOUNTER — TELEPHONE (OUTPATIENT)
Dept: MEDICAL GROUP | Facility: MEDICAL CENTER | Age: 39
End: 2019-07-15

## 2019-07-15 NOTE — TELEPHONE ENCOUNTER
Phone Number Called: [phone] (home)      Call outcome: spoke to patient regarding message below    Message: Spoke with patient regarding that his lab results. He understood.

## 2019-07-15 NOTE — TELEPHONE ENCOUNTER
----- Message from Susan Ramos sent at 7/15/2019 11:37 AM PDT -----  Pt would like a call with his blood work results. Thank you!

## 2019-10-04 ENCOUNTER — OFFICE VISIT (OUTPATIENT)
Dept: MEDICAL GROUP | Facility: MEDICAL CENTER | Age: 39
End: 2019-10-04
Payer: COMMERCIAL

## 2019-10-04 VITALS
HEIGHT: 69 IN | TEMPERATURE: 98.2 F | HEART RATE: 70 BPM | RESPIRATION RATE: 12 BRPM | SYSTOLIC BLOOD PRESSURE: 118 MMHG | WEIGHT: 199.96 LBS | DIASTOLIC BLOOD PRESSURE: 72 MMHG | OXYGEN SATURATION: 96 % | BODY MASS INDEX: 29.62 KG/M2

## 2019-10-04 DIAGNOSIS — F90.0 ATTENTION DEFICIT HYPERACTIVITY DISORDER (ADHD), PREDOMINANTLY INATTENTIVE TYPE: ICD-10-CM

## 2019-10-04 PROCEDURE — 99214 OFFICE O/P EST MOD 30 MIN: CPT | Performed by: FAMILY MEDICINE

## 2019-10-04 RX ORDER — METHYLPHENIDATE HYDROCHLORIDE EXTENDED RELEASE 20 MG/1
20 TABLET ORAL 2 TIMES DAILY
Qty: 60 TAB | Refills: 0 | Status: SHIPPED | OUTPATIENT
Start: 2019-12-03 | End: 2019-12-23

## 2019-10-04 RX ORDER — METHYLPHENIDATE HYDROCHLORIDE EXTENDED RELEASE 20 MG/1
20 TABLET ORAL 2 TIMES DAILY
Qty: 60 TAB | Refills: 0 | Status: SHIPPED | OUTPATIENT
Start: 2019-10-04 | End: 2019-10-04 | Stop reason: SDUPTHER

## 2019-10-04 RX ORDER — METHYLPHENIDATE HYDROCHLORIDE EXTENDED RELEASE 20 MG/1
20 TABLET ORAL 2 TIMES DAILY
Qty: 60 TAB | Refills: 0 | Status: SHIPPED | OUTPATIENT
Start: 2019-11-03 | End: 2019-10-04 | Stop reason: SDUPTHER

## 2019-10-04 NOTE — PROGRESS NOTES
CC:The encounter diagnosis was Attention deficit hyperactivity disorder (ADHD), predominantly inattentive type.    HISTORY OF PRESENT ILLNESS: Patient is a 39 y.o. male established patient who presents today to talk about his medications and get refills.  Patient also has some questions about his weight gain and how to work on weight loss      Attention deficit hyperactivity disorder (ADHD), predominantly inattentive type  This is a chronic health problem that is well controlled with current medications and lifestyle measures.    Obtained and reviewed patient utilization report from [facility] pharmacy database on 10/4/2019 10:00 AM  prior to writing prescription for controlled substance II, III or IV per [place] bill . Based on assessment of the report,my physical exam if necessary and the patient's health problem, the prescription is medically necessary.         Patient Active Problem List    Diagnosis Date Noted   • Swelling of hand joint 07/01/2019   • Chronic fatigue 07/25/2018   • Well adult exam 01/19/2018   • Attention deficit hyperactivity disorder (ADHD), predominantly inattentive type 01/19/2018      Allergies:Cillins [penicillins]    Current Outpatient Medications   Medication Sig Dispense Refill   • [START ON 12/3/2019] methylphenidate (METADATE ER) 20 MG CR tablet Take 1 Tab by mouth 2 Times a Day for 30 days. 60 Tab 0   • valacyclovir (VALTREX) 1 GM Tab   9     No current facility-administered medications for this visit.        Social History     Tobacco Use   • Smoking status: Never Smoker   • Smokeless tobacco: Never Used   Substance Use Topics   • Alcohol use: Yes     Alcohol/week: 1.2 oz     Types: 2 Cans of beer per week   • Drug use: No     Social History     Social History Narrative   • Not on file       Family History   Problem Relation Age of Onset   • No Known Problems Mother    • No Known Problems Father    • Cancer Sister         thyroid        ROS:     - Constitutional:  Negative  "for fever, chills, unexpected weight change, and fatigue/generalized weakness.    - HEENT:  Negative for headaches, vision changes, hearing changes, ear pain, ear discharge, rhinorrhea, sinus congestion, sore throat, and neck pain.      - Respiratory: Negative for cough, sputum production, chest congestion, dyspnea, wheezing, and crackles.      - Cardiovascular: Negative for chest pain, palpitations, orthopnea, and bilateral lower extremity edema.     - Gastrointestinal: Negative for heartburn, nausea, vomiting, abdominal pain, hematochezia, melena, diarrhea, constipation, and greasy/foul-smelling stools.     - Genitourinary: Negative for dysuria, polyuria, hematuria, pyuria, urinary urgency, and urinary incontinence.     - Musculoskeletal: Negative for myalgias, back pain, and joint pain.     - Skin: Negative for rash, itching, cyanotic skin color change.     - Neurological: Negative for dizziness, tingling, tremors, focal sensory deficit, focal weakness and headaches.     - Endo/Heme/Allergies: Does not bruise/bleed easily.     - Psychiatric/Behavioral: Negative for depression, suicidal/homicidal ideation and memory loss.          - NOTE: All other systems reviewed and are negative, except as in HPI.      Exam:    /72 (BP Location: Left arm, Patient Position: Sitting, BP Cuff Size: Adult)   Pulse 70   Temp 36.8 °C (98.2 °F) (Temporal)   Resp 12   Ht 1.753 m (5' 9\")   Wt 90.7 kg (199 lb 15.3 oz)   SpO2 96%  Body mass index is 29.53 kg/m².    General:  Well nourished, well developed male in NAD  Head is grossly normal.  Neck: Supple without JVD or bruit. Thyroid is not enlarged.  Pulmonary: Clear to ausculation and percussion.  Normal effort. No rales, ronchi, or wheezing.  Cardiovascular: Regular rate and rhythm without murmur. Carotid and radial pulses are intact and equal bilaterally.  Extremities: no clubbing, cyanosis, or edema.    Please note that this dictation was created using voice recognition " software. I have made every reasonable attempt to correct obvious errors, but I expect that there are errors of grammar and possibly content that I did not discover before finalizing the note.    Assessment/Plan:  1. Attention deficit hyperactivity disorder (ADHD), predominantly inattentive type  Adequately controlled with current meds.  Also had a discussion about weight loss and how to go about achieving that.  Patient is going to make some lifestyle changes increasing his exercise  - methylphenidate (METADATE ER) 20 MG CR tablet; Take 1 Tab by mouth 2 Times a Day for 30 days.  Dispense: 60 Tab; Refill: 0

## 2019-10-04 NOTE — ASSESSMENT & PLAN NOTE
This is a chronic health problem that is well controlled with current medications and lifestyle measures.    Obtained and reviewed patient utilization report from [facility] pharmacy database on 10/4/2019 10:00 AM  prior to writing prescription for controlled substance II, III or IV per [place] bill . Based on assessment of the report,my physical exam if necessary and the patient's health problem, the prescription is medically necessary.

## 2019-12-23 ENCOUNTER — OFFICE VISIT (OUTPATIENT)
Dept: MEDICAL GROUP | Facility: MEDICAL CENTER | Age: 39
End: 2019-12-23
Payer: COMMERCIAL

## 2019-12-23 VITALS
TEMPERATURE: 98.2 F | BODY MASS INDEX: 30.17 KG/M2 | SYSTOLIC BLOOD PRESSURE: 120 MMHG | RESPIRATION RATE: 12 BRPM | DIASTOLIC BLOOD PRESSURE: 72 MMHG | HEART RATE: 88 BPM | HEIGHT: 69 IN | OXYGEN SATURATION: 93 % | WEIGHT: 203.71 LBS

## 2019-12-23 DIAGNOSIS — F90.0 ATTENTION DEFICIT HYPERACTIVITY DISORDER (ADHD), PREDOMINANTLY INATTENTIVE TYPE: ICD-10-CM

## 2019-12-23 PROCEDURE — 99214 OFFICE O/P EST MOD 30 MIN: CPT | Performed by: FAMILY MEDICINE

## 2019-12-23 RX ORDER — METHYLPHENIDATE HYDROCHLORIDE EXTENDED RELEASE 10 MG/1
TABLET ORAL
Qty: 150 TAB | Refills: 0 | Status: SHIPPED | OUTPATIENT
Start: 2020-01-13 | End: 2020-01-13

## 2019-12-23 RX ORDER — METHYLPHENIDATE HYDROCHLORIDE EXTENDED RELEASE 10 MG/1
TABLET ORAL
Qty: 150 TAB | Refills: 0 | Status: SHIPPED | OUTPATIENT
Start: 2019-12-23 | End: 2019-12-23 | Stop reason: SDUPTHER

## 2019-12-23 NOTE — PROGRESS NOTES
CC:The encounter diagnosis was Attention deficit hyperactivity disorder (ADHD), predominantly inattentive type.    HISTORY OF PRESENT ILLNESS: Patient is a 39 y.o. male established patient who presents today to discuss his ADHD meds and problems he is having with current medication doses.      Attention deficit hyperactivity disorder (ADHD), predominantly inattentive type  Patient has noted some problems with this where he has been on the 20 mg twice a day now consistently for nearly a year.  He tends to take weekends off if he can but his wife has noted that when he takes the weekend off he does not concentrate as well as he does during the week.  He is also wondering if he might need a little bit more medication during the day.  We talked about leaving him on the 20 mg extended release twice a day but adding a 10 mg to the morning dose and continuing that 10 mg on the weekends as well.  Were going to try that.  If he feels like it is too activating, he can just drop back to the 20 mg twice a day and stop the 10 mg additional dosage.    Patient is very concerned about being on a slightly higher dose.  He has noted the problems but he is also afraid to change his dosage because it took so long to get it regulated.  In the past he has been on Metadate 20 mg extended release will do the 10 mg extended release he should have similar absorption but I think better concentration.      Patient Active Problem List    Diagnosis Date Noted   • Swelling of hand joint 07/01/2019   • Chronic fatigue 07/25/2018   • Well adult exam 01/19/2018   • Attention deficit hyperactivity disorder (ADHD), predominantly inattentive type 01/19/2018      Allergies:Cillins [penicillins]    Current Outpatient Medications   Medication Sig Dispense Refill   • [START ON 1/13/2020] methylphenidate (METADATE ER) 10 MG CR tablet 3 tablets in a.m. and 2 tablets in p.m. 150 Tab 0   • valacyclovir (VALTREX) 1 GM Tab   9     No current  facility-administered medications for this visit.        Social History     Tobacco Use   • Smoking status: Never Smoker   • Smokeless tobacco: Never Used   Substance Use Topics   • Alcohol use: Yes     Alcohol/week: 1.2 oz     Types: 2 Cans of beer per week   • Drug use: No     Social History     Patient does not qualify to have social determinant information on file (likely too young).   Social History Narrative   • Not on file       Family History   Problem Relation Age of Onset   • No Known Problems Mother    • No Known Problems Father    • Cancer Sister         thyroid        ROS:     - Constitutional:  Negative for fever, chills, unexpected weight change, and fatigue/generalized weakness.    - HEENT:  Negative for headaches, vision changes, hearing changes, ear pain, ear discharge, rhinorrhea, sinus congestion, sore throat, and neck pain.      - Respiratory: Negative for cough, sputum production, chest congestion, dyspnea, wheezing, and crackles.      - Cardiovascular: Negative for chest pain, palpitations, orthopnea, and bilateral lower extremity edema.     - Gastrointestinal: Negative for heartburn, nausea, vomiting, abdominal pain, hematochezia, melena, diarrhea, constipation, and greasy/foul-smelling stools.     - Genitourinary: Negative for dysuria, polyuria, hematuria, pyuria, urinary urgency, and urinary incontinence.     - Musculoskeletal: Negative for myalgias, back pain, and joint pain.     - Skin: Negative for rash, itching, cyanotic skin color change.     - Neurological: Negative for dizziness, tingling, tremors, focal sensory deficit, focal weakness and headaches.     - Endo/Heme/Allergies: Does not bruise/bleed easily.     - Psychiatric/Behavioral: Negative for depression, suicidal/homicidal ideation and memory loss.          - NOTE: All other systems reviewed and are negative, except as in HPI.      Exam:    /72 (BP Location: Left arm, Patient Position: Sitting, BP Cuff Size: Adult)    "Pulse 88   Temp 36.8 °C (98.2 °F) (Temporal)   Resp 12   Ht 1.753 m (5' 9\")   Wt 92.4 kg (203 lb 11.3 oz)   SpO2 93%  Body mass index is 30.08 kg/m².    General:  Well nourished, well developed male in NAD  Head is grossly normal.  Neck: Supple without JVD or bruit. Thyroid is not enlarged.  Pulmonary: Clear to ausculation and percussion.  Normal effort. No rales, ronchi, or wheezing.  Cardiovascular: Regular rate and rhythm without murmur. Carotid and radial pulses are intact and equal bilaterally.  Extremities: no clubbing, cyanosis, or edema.  Patient was seen for 25 minutes face to face of which, 20 minutes was spent counseling regarding medications interactions and side effects.  Discussion of how to slowly adjust his meds and how to deal with trying to take weekends off.  On weekends he will take just the 10 mg dose to see if that is enough to help him to concentrate and be able to interact with his family appropriately..    Please note that this dictation was created using voice recognition software. I have made every reasonable attempt to correct obvious errors, but I expect that there are errors of grammar and possibly content that I did not discover before finalizing the note.    Assessment/Plan:  1. Attention deficit hyperactivity disorder (ADHD), predominantly inattentive type  Uncontrolled because dosing is not appropriate.  Were going to make some changes in his meds and then see him back within 2 months.  - methylphenidate (METADATE ER) 10 MG CR tablet; 3 tablets in a.m. and 2 tablets in p.m.  Dispense: 150 Tab; Refill: 0         "

## 2019-12-23 NOTE — ASSESSMENT & PLAN NOTE
Patient has noted some problems with this where he has been on the 20 mg twice a day now consistently for nearly a year.  He tends to take weekends off if he can but his wife has noted that when he takes the weekend off he does not concentrate as well as he does during the week.  He is also wondering if he might need a little bit more medication during the day.  We talked about leaving him on the 20 mg extended release twice a day but adding a 10 mg to the morning dose and continuing that 10 mg on the weekends as well.  Were going to try that.  If he feels like it is too activating, he can just drop back to the 20 mg twice a day and stop the 10 mg additional dosage.    Patient is very concerned about being on a slightly higher dose.  He has noted the problems but he is also afraid to change his dosage because it took so long to get it regulated.  In the past he has been on Metadate 20 mg extended release will do the 10 mg extended release he should have similar absorption but I think better concentration.

## 2019-12-31 ENCOUNTER — TELEPHONE (OUTPATIENT)
Dept: MEDICAL GROUP | Facility: MEDICAL CENTER | Age: 39
End: 2019-12-31

## 2019-12-31 NOTE — TELEPHONE ENCOUNTER
DOCUMENTATION OF PAR STATUS:    1. Name of Medication & Dose: methylphenidate (METADATE ER) 10 MG CR tablet      2. Name of Prescription Coverage Company & phone #:     3. Date Prior Auth Submitted: 12/31/2019    4. What information was given to obtain insurance decision? In Chart    5. Prior Auth Status? Pending    6. Patient Notified: yes

## 2019-12-31 NOTE — TELEPHONE ENCOUNTER
MEDICATION PRIOR AUTHORIZATION NEEDED:    1. Name of Medication: methylphenidate (METADATE ER) 10 MG CR tablet     2. Requested By (Name of Pharmacy): [facility]     3. Is insurance on file current? Yes    4. What is the name & phone number of the 3rd party payor? [employer]

## 2020-01-13 DIAGNOSIS — F90.0 ATTENTION DEFICIT HYPERACTIVITY DISORDER (ADHD), PREDOMINANTLY INATTENTIVE TYPE: ICD-10-CM

## 2020-01-13 RX ORDER — METHYLPHENIDATE HYDROCHLORIDE EXTENDED RELEASE 20 MG/1
20 TABLET ORAL 2 TIMES DAILY
Qty: 60 TAB | Refills: 0 | Status: SHIPPED | OUTPATIENT
Start: 2020-02-12 | End: 2020-01-13 | Stop reason: SDUPTHER

## 2020-01-13 RX ORDER — METHYLPHENIDATE HYDROCHLORIDE EXTENDED RELEASE 20 MG/1
20 TABLET ORAL 2 TIMES DAILY
Qty: 60 TAB | Refills: 0 | Status: SHIPPED | OUTPATIENT
Start: 2020-01-13 | End: 2020-01-13 | Stop reason: SDUPTHER

## 2020-01-13 RX ORDER — METHYLPHENIDATE HYDROCHLORIDE EXTENDED RELEASE 20 MG/1
20 TABLET ORAL 2 TIMES DAILY
Qty: 60 TAB | Refills: 0 | Status: SHIPPED | OUTPATIENT
Start: 2020-03-13 | End: 2020-04-21 | Stop reason: SDUPTHER

## 2020-02-07 ENCOUNTER — APPOINTMENT (OUTPATIENT)
Dept: MEDICAL GROUP | Facility: MEDICAL CENTER | Age: 40
End: 2020-02-07
Payer: COMMERCIAL

## 2020-02-18 ENCOUNTER — PATIENT MESSAGE (OUTPATIENT)
Dept: MEDICAL GROUP | Facility: MEDICAL CENTER | Age: 40
End: 2020-02-18

## 2020-02-20 ENCOUNTER — APPOINTMENT (RX ONLY)
Dept: URBAN - METROPOLITAN AREA CLINIC 4 | Facility: CLINIC | Age: 40
Setting detail: DERMATOLOGY
End: 2020-02-20

## 2020-02-20 DIAGNOSIS — D22 MELANOCYTIC NEVI: ICD-10-CM

## 2020-02-20 DIAGNOSIS — L81.4 OTHER MELANIN HYPERPIGMENTATION: ICD-10-CM

## 2020-02-20 DIAGNOSIS — L82.1 OTHER SEBORRHEIC KERATOSIS: ICD-10-CM

## 2020-02-20 DIAGNOSIS — D18.0 HEMANGIOMA: ICD-10-CM

## 2020-02-20 PROBLEM — D22.5 MELANOCYTIC NEVI OF TRUNK: Status: ACTIVE | Noted: 2020-02-20

## 2020-02-20 PROBLEM — D18.01 HEMANGIOMA OF SKIN AND SUBCUTANEOUS TISSUE: Status: ACTIVE | Noted: 2020-02-20

## 2020-02-20 PROBLEM — D48.5 NEOPLASM OF UNCERTAIN BEHAVIOR OF SKIN: Status: ACTIVE | Noted: 2020-02-20

## 2020-02-20 PROCEDURE — ? SUNSCREEN RECOMMENDATIONS

## 2020-02-20 PROCEDURE — 99214 OFFICE O/P EST MOD 30 MIN: CPT | Mod: 25

## 2020-02-20 PROCEDURE — 11102 TANGNTL BX SKIN SINGLE LES: CPT

## 2020-02-20 PROCEDURE — ? BIOPSY BY SHAVE METHOD

## 2020-02-20 PROCEDURE — ? COUNSELING

## 2020-02-20 ASSESSMENT — LOCATION SIMPLE DESCRIPTION DERM
LOCATION SIMPLE: LOWER BACK
LOCATION SIMPLE: RIGHT LOWER BACK
LOCATION SIMPLE: UPPER BACK
LOCATION SIMPLE: RIGHT UPPER BACK
LOCATION SIMPLE: ABDOMEN
LOCATION SIMPLE: LEFT THIGH

## 2020-02-20 ASSESSMENT — LOCATION DETAILED DESCRIPTION DERM
LOCATION DETAILED: RIGHT MEDIAL UPPER BACK
LOCATION DETAILED: SUPERIOR LUMBAR SPINE
LOCATION DETAILED: RIGHT SUPERIOR MEDIAL MIDBACK
LOCATION DETAILED: LEFT ANTERIOR DISTAL THIGH
LOCATION DETAILED: EPIGASTRIC SKIN
LOCATION DETAILED: INFERIOR THORACIC SPINE

## 2020-02-20 ASSESSMENT — LOCATION ZONE DERM
LOCATION ZONE: LEG
LOCATION ZONE: TRUNK

## 2020-02-20 NOTE — PROCEDURE: BIOPSY BY SHAVE METHOD
Detail Level: Detailed
Depth Of Biopsy: dermis
Was A Bandage Applied: Yes
Size Of Lesion In Cm: 0
Biopsy Type: H and E
Biopsy Method: Personna blade
Anesthesia Type: 1% lidocaine with epinephrine
Anesthesia Volume In Cc: 2
Hemostasis: Drysol
Wound Care: Bacitracin
Dressing: bandage
Destruction After The Procedure: No
Type Of Destruction Used: Curettage
Curettage Text: The wound bed was treated with curettage after the biopsy was performed.
Cryotherapy Text: The wound bed was treated with cryotherapy after the biopsy was performed.
Electrodesiccation Text: The wound bed was treated with electrodesiccation after the biopsy was performed.
Electrodesiccation And Curettage Text: The wound bed was treated with electrodesiccation and curettage after the biopsy was performed.
Silver Nitrate Text: The wound bed was treated with silver nitrate after the biopsy was performed.
Lab: [id number]
Lab Facility: 
Consent: Written consent was obtained and risks were reviewed including but not limited to scarring, infection, bleeding, scabbing, incomplete removal, nerve damage and allergy to anesthesia.
Post-Care Instructions: I reviewed with the patient in detail post-care instructions. Patient is to keep the biopsy site dry overnight, and then apply bacitracin twice daily until healed. Patient may apply hydrogen peroxide soaks to remove any crusting.
Notification Instructions: Patient will be notified of biopsy results. However, patient instructed to call the office if not contacted within 2 weeks.
Billing Type: Third-Party Bill

## 2020-02-21 RX ORDER — SILDENAFIL CITRATE 20 MG/1
20 TABLET ORAL DAILY
Qty: 30 TAB | Refills: 11 | Status: SHIPPED | OUTPATIENT
Start: 2020-02-21 | End: 2020-12-02

## 2020-03-06 RX ORDER — CEFUROXIME AXETIL 250 MG/1
250 TABLET ORAL 2 TIMES DAILY
Qty: 20 TAB | Refills: 0 | Status: SHIPPED | OUTPATIENT
Start: 2020-03-06 | End: 2020-03-16

## 2020-03-06 NOTE — PROGRESS NOTES
Patient believes he has strep and is traveling having a low-grade fever and sore throat.  Patient is pen allergic.  We will treat with Ceftin 250 mg twice daily for a full 10 days.

## 2020-03-08 ENCOUNTER — OFFICE VISIT (OUTPATIENT)
Dept: URGENT CARE | Facility: MEDICAL CENTER | Age: 40
End: 2020-03-08
Payer: COMMERCIAL

## 2020-03-08 VITALS
SYSTOLIC BLOOD PRESSURE: 128 MMHG | DIASTOLIC BLOOD PRESSURE: 78 MMHG | OXYGEN SATURATION: 95 % | TEMPERATURE: 98.8 F | HEART RATE: 84 BPM

## 2020-03-08 DIAGNOSIS — J06.9 URI, ACUTE: ICD-10-CM

## 2020-03-08 DIAGNOSIS — J02.9 SORE THROAT: ICD-10-CM

## 2020-03-08 PROCEDURE — 99214 OFFICE O/P EST MOD 30 MIN: CPT | Performed by: PHYSICIAN ASSISTANT

## 2020-03-08 ASSESSMENT — ENCOUNTER SYMPTOMS
TROUBLE SWALLOWING: 0
WHEEZING: 0
SPUTUM PRODUCTION: 0
COUGH: 1
STRIDOR: 0
HOARSE VOICE: 1
SWOLLEN GLANDS: 1
SORE THROAT: 1
SHORTNESS OF BREATH: 0
VOMITING: 0
HEMOPTYSIS: 0

## 2020-03-08 NOTE — PROGRESS NOTES
Subjective:      Kvng Saldivar is a 39 y.o. male who presents with Pharyngitis (sore throat, chest tightness, been on antibiotic for 2 days ceftin 250mg)    PMH:  has a past medical history of Attention deficit hyperactivity disorder (ADHD), predominantly inattentive type (1/19/2018), Chronic fatigue (7/25/2018), Left upper quadrant abdominal mass (7/25/2018), and Well adult exam (1/19/2018).  MEDS:   Current Outpatient Medications:   •  cefUROXime (CEFTIN) 250 MG Tab, Take 1 Tab by mouth 2 times a day for 10 days., Disp: 20 Tab, Rfl: 0  •  [START ON 3/13/2020] methylphenidate (METADATE ER) 20 MG CR tablet, Take 1 Tab by mouth 2 Times a Day for 30 days., Disp: 60 Tab, Rfl: 0  •  sildenafil (REVATIO) 20 MG tablet, Take 1 Tab by mouth every day for 360 days., Disp: 30 Tab, Rfl: 11  •  valacyclovir (VALTREX) 1 GM Tab, , Disp: , Rfl: 9  ALLERGIES:   Allergies   Allergen Reactions   • Cillins [Penicillins]      Hives, swelling      SURGHX:   Past Surgical History:   Procedure Laterality Date   • ACL RECONSTRUCTION SCOPE Right    • DENTAL EXTRACTION(S)       SOCHX:  reports that he has never smoked. He has never used smokeless tobacco. He reports current alcohol use of about 1.2 oz of alcohol per week. He reports that he does not use drugs.  FH: Reviewed with patient, not pertinent to this visit.           Patient presents with:  Pharyngitis: sore throat, dry cough and pain with cough.  PT has been on an antibiotic for 2 days with improvement in sore throat, but not the cough. PT denies fever, chills, n/v/d or chest pain.         Pharyngitis    This is a new problem. The current episode started in the past 7 days (4 days). The problem has been gradually improving. Neither side of throat is experiencing more pain than the other. There has been no fever. The pain is at a severity of 4/10. Associated symptoms include coughing, a hoarse voice, a plugged ear sensation and swollen glands. Pertinent negatives include no  congestion, ear discharge, ear pain, shortness of breath, stridor, trouble swallowing or vomiting. He has had exposure to strep. He has tried cool liquids and gargles (antibiotics) for the symptoms. The treatment provided no relief.       Review of Systems   HENT: Positive for hoarse voice and sore throat. Negative for congestion, ear discharge, ear pain and trouble swallowing.    Respiratory: Positive for cough. Negative for hemoptysis, sputum production, shortness of breath, wheezing and stridor.    Gastrointestinal: Negative for vomiting.   All other systems reviewed and are negative.         Objective:     /78   Pulse 84   Temp 37.1 °C (98.8 °F) (Temporal)   SpO2 95%      Physical Exam  Vitals signs and nursing note reviewed.   Constitutional:       General: He is not in acute distress.     Appearance: He is well-developed.   HENT:      Head: Normocephalic and atraumatic.      Right Ear: Tympanic membrane normal.      Left Ear: Tympanic membrane normal.      Nose: Nose normal. No congestion or rhinorrhea.      Mouth/Throat:      Pharynx: Uvula midline. Posterior oropharyngeal erythema present. No oropharyngeal exudate.   Eyes:      Pupils: Pupils are equal, round, and reactive to light.   Neck:      Musculoskeletal: Normal range of motion.   Cardiovascular:      Rate and Rhythm: Normal rate.   Pulmonary:      Effort: Pulmonary effort is normal.   Abdominal:      Palpations: Abdomen is soft.   Musculoskeletal: Normal range of motion.   Lymphadenopathy:      Cervical: No cervical adenopathy.   Skin:     General: Skin is warm and dry.   Neurological:      Mental Status: He is alert and oriented to person, place, and time.                 Assessment/Plan:     1. URI, acute     2. Sore throat       PT to continue taking prescription medications as prescribed.      PT can continue OTC medications, increase fluids and rest until symptoms improve.     PT declined strep test, as he is already on antibiotics.   Cough is likely viral, will take otc cough medicine for symptoms.      PT should follow up with PCP in 1-2 days for re-evaluation if symptoms have not improved.  Discussed red flags and reasons to return to UC or ED.  Pt and/or family verbalized understanding of diagnosis and follow up instructions and was offered informational handout on diagnosis.  PT discharged.

## 2020-03-14 ENCOUNTER — HOSPITAL ENCOUNTER (OUTPATIENT)
Facility: MEDICAL CENTER | Age: 40
End: 2020-03-14
Attending: NURSE PRACTITIONER
Payer: COMMERCIAL

## 2020-03-14 ENCOUNTER — OFFICE VISIT (OUTPATIENT)
Dept: URGENT CARE | Facility: CLINIC | Age: 40
End: 2020-03-14
Payer: COMMERCIAL

## 2020-03-14 VITALS — TEMPERATURE: 97.8 F | HEART RATE: 78 BPM | OXYGEN SATURATION: 96 % | RESPIRATION RATE: 16 BRPM

## 2020-03-14 DIAGNOSIS — R05.9 COUGH: ICD-10-CM

## 2020-03-14 DIAGNOSIS — J02.9 PHARYNGITIS, UNSPECIFIED ETIOLOGY: ICD-10-CM

## 2020-03-14 DIAGNOSIS — J98.8 RTI (RESPIRATORY TRACT INFECTION): ICD-10-CM

## 2020-03-14 LAB
FLUAV+FLUBV AG SPEC QL IA: NEGATIVE
INT CON NEG: NEGATIVE
INT CON NEG: NEGATIVE
INT CON POS: POSITIVE
INT CON POS: POSITIVE
S PYO AG THROAT QL: NEGATIVE

## 2020-03-14 PROCEDURE — 87633 RESP VIRUS 12-25 TARGETS: CPT

## 2020-03-14 PROCEDURE — 87880 STREP A ASSAY W/OPTIC: CPT | Performed by: NURSE PRACTITIONER

## 2020-03-14 PROCEDURE — 99213 OFFICE O/P EST LOW 20 MIN: CPT | Performed by: NURSE PRACTITIONER

## 2020-03-14 PROCEDURE — 87486 CHLMYD PNEUM DNA AMP PROBE: CPT

## 2020-03-14 PROCEDURE — 87581 M.PNEUMON DNA AMP PROBE: CPT

## 2020-03-14 PROCEDURE — 87804 INFLUENZA ASSAY W/OPTIC: CPT | Performed by: NURSE PRACTITIONER

## 2020-03-14 PROCEDURE — 87502 INFLUENZA DNA AMP PROBE: CPT

## 2020-03-14 RX ORDER — DEXTROMETHORPHAN HYDROBROMIDE AND PROMETHAZINE HYDROCHLORIDE 15; 6.25 MG/5ML; MG/5ML
5 SYRUP ORAL EVERY 4 HOURS PRN
Qty: 120 ML | Refills: 0 | Status: SHIPPED | OUTPATIENT
Start: 2020-03-14 | End: 2020-06-25

## 2020-03-14 ASSESSMENT — ENCOUNTER SYMPTOMS
EYE PAIN: 0
DIZZINESS: 0
WHEEZING: 0
MYALGIAS: 0
SHORTNESS OF BREATH: 0
RHINORRHEA: 1
CHILLS: 0
SINUS PAIN: 0
FEVER: 1
HEADACHES: 0
COUGH: 1
SORE THROAT: 1
NAUSEA: 0
VOMITING: 0

## 2020-03-15 DIAGNOSIS — J98.8 RTI (RESPIRATORY TRACT INFECTION): ICD-10-CM

## 2020-03-15 LAB
C PNEUM DNA SPEC QL NAA+PROBE: NOT DETECTED
FLUAV H1 2009 PAND RNA SPEC QL NAA+PROBE: NOT DETECTED
FLUAV H1 RNA SPEC QL NAA+PROBE: NOT DETECTED
FLUAV H3 RNA SPEC QL NAA+PROBE: NOT DETECTED
FLUAV RNA SPEC QL NAA+PROBE: NEGATIVE
FLUAV RNA SPEC QL NAA+PROBE: NOT DETECTED
FLUBV RNA SPEC QL NAA+PROBE: NEGATIVE
FLUBV RNA SPEC QL NAA+PROBE: NOT DETECTED
HADV DNA SPEC QL NAA+PROBE: NOT DETECTED
HCOV RNA SPEC QL NAA+PROBE: NOT DETECTED
HMPV RNA SPEC QL NAA+PROBE: NOT DETECTED
HPIV1 RNA SPEC QL NAA+PROBE: NOT DETECTED
HPIV2 RNA SPEC QL NAA+PROBE: NOT DETECTED
HPIV3 RNA SPEC QL NAA+PROBE: NOT DETECTED
HPIV4 RNA SPEC QL NAA+PROBE: NOT DETECTED
M PNEUMO DNA SPEC QL NAA+PROBE: NOT DETECTED
RSV A RNA SPEC QL NAA+PROBE: NOT DETECTED
RSV B RNA SPEC QL NAA+PROBE: NOT DETECTED
RV+EV RNA SPEC QL NAA+PROBE: NOT DETECTED

## 2020-03-16 ENCOUNTER — TELEPHONE (OUTPATIENT)
Dept: URGENT CARE | Facility: CLINIC | Age: 40
End: 2020-03-16

## 2020-03-19 LAB
SARS-COV-2 RNA RESP QL NAA+PROBE: NOT DETECTED
SPECIMEN SOURCE: NORMAL

## 2020-03-29 ENCOUNTER — PATIENT MESSAGE (OUTPATIENT)
Dept: MEDICAL GROUP | Facility: MEDICAL CENTER | Age: 40
End: 2020-03-29

## 2020-04-21 DIAGNOSIS — F90.0 ATTENTION DEFICIT HYPERACTIVITY DISORDER (ADHD), PREDOMINANTLY INATTENTIVE TYPE: ICD-10-CM

## 2020-04-21 RX ORDER — METHYLPHENIDATE HYDROCHLORIDE EXTENDED RELEASE 20 MG/1
20 TABLET ORAL 2 TIMES DAILY
Qty: 60 TAB | Refills: 0 | Status: SHIPPED
Start: 2020-06-20 | End: 2020-06-25

## 2020-04-21 RX ORDER — METHYLPHENIDATE HYDROCHLORIDE EXTENDED RELEASE 20 MG/1
20 TABLET ORAL 2 TIMES DAILY
Qty: 60 TAB | Refills: 0 | Status: SHIPPED | OUTPATIENT
Start: 2020-04-21 | End: 2020-04-21 | Stop reason: SDUPTHER

## 2020-04-21 RX ORDER — METHYLPHENIDATE HYDROCHLORIDE EXTENDED RELEASE 20 MG/1
20 TABLET ORAL 2 TIMES DAILY
Qty: 60 TAB | Refills: 0 | Status: SHIPPED | OUTPATIENT
Start: 2020-05-21 | End: 2020-04-21 | Stop reason: SDUPTHER

## 2020-06-25 ENCOUNTER — TELEMEDICINE (OUTPATIENT)
Dept: MEDICAL GROUP | Facility: PHYSICIAN GROUP | Age: 40
End: 2020-06-25
Payer: COMMERCIAL

## 2020-06-25 VITALS — SYSTOLIC BLOOD PRESSURE: 120 MMHG | HEART RATE: 80 BPM | DIASTOLIC BLOOD PRESSURE: 70 MMHG | RESPIRATION RATE: 16 BRPM

## 2020-06-25 DIAGNOSIS — F90.0 ATTENTION DEFICIT HYPERACTIVITY DISORDER (ADHD), PREDOMINANTLY INATTENTIVE TYPE: ICD-10-CM

## 2020-06-25 PROCEDURE — 99214 OFFICE O/P EST MOD 30 MIN: CPT | Performed by: FAMILY MEDICINE

## 2020-06-25 RX ORDER — CEFUROXIME AXETIL 250 MG/1
250 TABLET ORAL 2 TIMES DAILY
Qty: 14 TAB | Refills: 0 | Status: SHIPPED
Start: 2020-06-25 | End: 2020-06-26

## 2020-06-25 RX ORDER — METHYLPHENIDATE HYDROCHLORIDE EXTENDED RELEASE 10 MG/1
TABLET ORAL
Qty: 150 TAB | Refills: 0 | Status: SHIPPED | OUTPATIENT
Start: 2020-06-25 | End: 2020-08-21 | Stop reason: SDUPTHER

## 2020-06-25 ASSESSMENT — PATIENT HEALTH QUESTIONNAIRE - PHQ9: CLINICAL INTERPRETATION OF PHQ2 SCORE: 0

## 2020-06-25 NOTE — PROGRESS NOTES
Telemedicine Visit: Established Patient     This encounter was conducted via [employer].   Verbal consent was obtained. Patient's identity was verified.  As a means of avoiding spread of COVID-19, this visit is being conducted by Telemedicine.         Subjective:   CC: The encounter diagnosis was Attention deficit hyperactivity disorder (ADHD), predominantly inattentive type.    Xander Saldivar is a 40 y.o. male presenting for evaluation and management of:      Attention deficit hyperactivity disorder (ADHD), predominantly inattentive type  Is a chronic health problem for this patient where he had been on 20 mg of the extended release twice daily and found that on the weekends he did not really need that much of a dose so he was cutting his tablets in half.  Unfortunately cutting the tablet in half gives him immediate release of the medication and he was having this rush and then feeling draggy the rest of the day.  He also does not feel like he has the ability to concentrate as well as he would like on the 20 mg twice a day during the week.  He really needs to be able to focus first thing in the morning.  Were going to try going up to 30 mg in the morning and continuing 20 mg in the afternoon.  Were going to write for the 10 mg tablets so he could then vary his dose as needed according to those recommendations.  Because this is a new prescribing level he should be able to get this filled immediately and then he can let me know over the coming 30 days how this is working for him.  Once we have that feedback we can then write new prescriptions.      ROS:     - Constitutional:  Negative for fever, chills, unexpected weight change, and fatigue/generalized weakness.    - HEENT:  Negative for headaches, vision changes, hearing changes, ear pain, ear discharge, rhinorrhea, sinus congestion, sore throat, and neck pain.      - Respiratory: Negative for cough, sputum production, chest congestion, dyspnea, wheezing, and  crackles.      - Cardiovascular: Negative for chest pain, palpitations, orthopnea, and bilateral lower extremity edema.     - Gastrointestinal: Negative for heartburn, nausea, vomiting, abdominal pain, hematochezia, melena, diarrhea, constipation, and greasy/foul-smelling stools.     - Genitourinary: Negative for dysuria, polyuria, hematuria, pyuria, urinary urgency, and urinary incontinence.     - Musculoskeletal: Negative for myalgias, back pain, and joint pain.     - Skin: Negative for rash, itching, cyanotic skin color change.     - Neurological: Negative for dizziness, tingling, tremors, focal sensory deficit, focal weakness and headaches.     - Endo/Heme/Allergies: Does not bruise/bleed easily.     - Psychiatric/Behavioral: Negative for depression, suicidal/homicidal ideation and memory loss.          - NOTE: All other systems reviewed and are negative, except as in HPI.      Patient Active Problem List    Diagnosis Date Noted   • Swelling of hand joint 07/01/2019   • Chronic fatigue 07/25/2018   • Well adult exam 01/19/2018   • Attention deficit hyperactivity disorder (ADHD), predominantly inattentive type 01/19/2018      Allergies:Cillins [penicillins]    Current Outpatient Medications   Medication Sig Dispense Refill   • methylphenidate (METADATE ER) 10 MG CR tablet 3 tabs in AM and 2 tabs in  Tab 0   • valacyclovir (VALTREX) 1 GM Tab TAKE 2 TABLETS BY MOUTH AT FIRST SIGN OF COLD SORE, THEN TAKE 2 TABLETS BY MOUTH 12 HOURS LATER 60 Tab 3   • cefUROXime (CEFTIN) 250 MG Tab Take 1 Tab by mouth 2 times a day for 7 days. 14 Tab 0   • sildenafil (REVATIO) 20 MG tablet Take 1 Tab by mouth every day for 360 days. 30 Tab 11     No current facility-administered medications for this visit.        Social History     Tobacco Use   • Smoking status: Never Smoker   • Smokeless tobacco: Never Used   Substance Use Topics   • Alcohol use: Yes     Alcohol/week: 1.2 oz     Types: 2 Cans of beer per week   • Drug use: No      Social History     Social History Narrative   • Not on file       Family History   Problem Relation Age of Onset   • No Known Problems Mother    • No Known Problems Father    • Cancer Sister         thyroid          Objective:   Vitals obtained by patient:    /70   Pulse 80   Resp 16   There is no height or weight on file to calculate BMI.    Physical Exam:  Constitutional: Alert, no distress, well-groomed.  Skin: No rashes in visible areas.  Eye: Round. Conjunctiva clear, lids normal. No icterus.   ENMT: Lips pink without lesions, good dentition, moist mucous membranes. Phonation normal.  Neck: No masses, no thyromegaly. Moves freely without pain.  CV: Pulse as reported by patient  Respiratory: Unlabored respiratory effort, no cough or audible wheeze  Psych: Alert and oriented x3, normal affect and mood.       Assessment and Plan:   The following treatment plan was discussed:     1. Attention deficit hyperactivity disorder (ADHD), predominantly inattentive type  Uncontrolled, this patient is continuing to have problems on current dosing of methylphenidate 20 mg twice daily.  Were going to try going to 30 mg in the morning and 20 mg in the afternoon and see if this does not work better for him.  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  It is important that we get his medication figured out so that his thought processes are smooth.  - methylphenidate (METADATE ER) 10 MG CR tablet; 3 tabs in AM and 2 tabs in PM  Dispense: 150 Tab; Refill: 0      Patient was seen for 25 minutes face to face of which, 20 minutes was spent counseling regarding discussion of med side effects and reactions.  Had a long discussion regarding extended release medications and why we do not cut them in half..      Follow-up: 30 days for follow-up on current dosing

## 2020-06-25 NOTE — ASSESSMENT & PLAN NOTE
Is a chronic health problem for this patient where he had been on 20 mg of the extended release twice daily and found that on the weekends he did not really need that much of a dose so he was cutting his tablets in half.  Unfortunately cutting the tablet in half gives him immediate release of the medication and he was having this rush and then feeling draggy the rest of the day.  He also does not feel like he has the ability to concentrate as well as he would like on the 20 mg twice a day during the week.  He really needs to be able to focus first thing in the morning.  Were going to try going up to 30 mg in the morning and continuing 20 mg in the afternoon.  Were going to write for the 10 mg tablets so he could then vary his dose as needed according to those recommendations.  Because this is a new prescribing level he should be able to get this filled immediately and then he can let me know over the coming 30 days how this is working for him.  Once we have that feedback we can then write new prescriptions.

## 2020-06-26 ENCOUNTER — HOSPITAL ENCOUNTER (OUTPATIENT)
Facility: MEDICAL CENTER | Age: 40
End: 2020-06-26
Attending: PHYSICIAN ASSISTANT
Payer: COMMERCIAL

## 2020-06-26 ENCOUNTER — OFFICE VISIT (OUTPATIENT)
Dept: URGENT CARE | Facility: CLINIC | Age: 40
End: 2020-06-26
Payer: COMMERCIAL

## 2020-06-26 VITALS
TEMPERATURE: 98.2 F | OXYGEN SATURATION: 96 % | DIASTOLIC BLOOD PRESSURE: 88 MMHG | SYSTOLIC BLOOD PRESSURE: 124 MMHG | WEIGHT: 198 LBS | HEIGHT: 70 IN | HEART RATE: 85 BPM | BODY MASS INDEX: 28.35 KG/M2 | RESPIRATION RATE: 16 BRPM

## 2020-06-26 DIAGNOSIS — J02.9 PHARYNGITIS, UNSPECIFIED ETIOLOGY: ICD-10-CM

## 2020-06-26 DIAGNOSIS — R51.9 NONINTRACTABLE HEADACHE, UNSPECIFIED CHRONICITY PATTERN, UNSPECIFIED HEADACHE TYPE: ICD-10-CM

## 2020-06-26 LAB
COVID ORDER STATUS COVID19: NORMAL
INT CON NEG: NORMAL
INT CON POS: NORMAL
S PYO AG THROAT QL: NORMAL

## 2020-06-26 PROCEDURE — 87880 STREP A ASSAY W/OPTIC: CPT | Performed by: PHYSICIAN ASSISTANT

## 2020-06-26 PROCEDURE — U0003 INFECTIOUS AGENT DETECTION BY NUCLEIC ACID (DNA OR RNA); SEVERE ACUTE RESPIRATORY SYNDROME CORONAVIRUS 2 (SARS-COV-2) (CORONAVIRUS DISEASE [COVID-19]), AMPLIFIED PROBE TECHNIQUE, MAKING USE OF HIGH THROUGHPUT TECHNOLOGIES AS DESCRIBED BY CMS-2020-01-R: HCPCS

## 2020-06-26 PROCEDURE — 99213 OFFICE O/P EST LOW 20 MIN: CPT | Performed by: PHYSICIAN ASSISTANT

## 2020-06-26 ASSESSMENT — ENCOUNTER SYMPTOMS
ABDOMINAL PAIN: 0
CONSTIPATION: 0
WHEEZING: 0
PALPITATIONS: 0
PHOTOPHOBIA: 0
HEADACHES: 1
EYE REDNESS: 0
DIARRHEA: 0
EYE PAIN: 1
DIZZINESS: 0
SPEECH CHANGE: 0
NAUSEA: 0
CHILLS: 0
FOCAL WEAKNESS: 0
SPUTUM PRODUCTION: 0
BLURRED VISION: 0
MYALGIAS: 0
COUGH: 0
ORTHOPNEA: 0
SORE THROAT: 1
SHORTNESS OF BREATH: 0
SENSORY CHANGE: 0
VOMITING: 0
SINUS PAIN: 1
FEVER: 0

## 2020-06-26 ASSESSMENT — FIBROSIS 4 INDEX: FIB4 SCORE: 1.07

## 2020-06-27 LAB
SARS-COV-2 RNA RESP QL NAA+PROBE: NOTDETECTED
SPECIMEN SOURCE: NORMAL

## 2020-06-27 NOTE — PROGRESS NOTES
"Subjective:   Xander Saldivar is a 40 y.o. male who presents for Head Pain (x2 days ); Ear Pain; and Eye Pain      41 yo male presents c/o mild global non-pulsatile headache and mild eye pain, ear pain, and sore throat.  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    Pt denies f/c, CP, SOB, palp, rash, LE pain or swelling.  Has not tried any otc meds.       Review of Systems   Constitutional: Positive for malaise/fatigue. Negative for chills and fever.   HENT: Positive for congestion, ear pain, sinus pain and sore throat. Negative for ear discharge, hearing loss, nosebleeds and tinnitus.    Eyes: Positive for pain. Negative for blurred vision, photophobia and redness.   Respiratory: Negative for cough, sputum production, shortness of breath and wheezing.    Cardiovascular: Negative for chest pain, palpitations and orthopnea.   Gastrointestinal: Negative for abdominal pain, constipation, diarrhea, nausea and vomiting.   Musculoskeletal: Negative for myalgias.   Skin: Negative for rash.   Neurological: Positive for headaches. Negative for dizziness, sensory change, speech change and focal weakness.       Medications, Allergies, and current problem list reviewed today in Epic.     Objective:     /88   Pulse 85   Temp 36.8 °C (98.2 °F) (Temporal)   Resp 16   Ht 1.778 m (5' 10\")   Wt 89.8 kg (198 lb)   SpO2 96%     Physical Exam  Vitals signs reviewed.   Constitutional:       General: He is not in acute distress.     Appearance: Normal appearance. He is not toxic-appearing.   HENT:      Head: Normocephalic and atraumatic.      Right Ear: Tympanic membrane, ear canal and external ear normal.      Left Ear: Tympanic membrane, ear canal and external ear normal.      Nose: Nose normal. No congestion or " rhinorrhea.      Mouth/Throat:      Mouth: Mucous membranes are moist.      Pharynx: No oropharyngeal exudate or posterior oropharyngeal erythema.   Eyes:      Conjunctiva/sclera: Conjunctivae normal.   Neck:      Musculoskeletal: Normal range of motion.   Cardiovascular:      Rate and Rhythm: Normal rate and regular rhythm.   Pulmonary:      Effort: Pulmonary effort is normal.      Breath sounds: Normal breath sounds.   Lymphadenopathy:      Cervical: No cervical adenopathy.   Skin:     General: Skin is warm and dry.      Capillary Refill: Capillary refill takes less than 2 seconds.   Neurological:      Mental Status: He is alert and oriented to person, place, and time.         Assessment/Plan:     Diagnosis and associated orders:     1. Nonintractable headache, unspecified chronicity pattern, unspecified headache type  COVID/SARS COV-2 PCR    POCT Rapid Strep A   2. Pharyngitis, unspecified etiology  COVID/SARS COV-2 PCR    POCT Rapid Strep A      Comments/MDM:     • Benign exam.  Strep negative in office.  Screening covid test.          Differential diagnosis, natural history, supportive care, and indications for immediate follow-up discussed.    Advised the patient to follow-up with the primary care physician for recheck, reevaluation, and consideration of further management.    Please note that this dictation was created using voice recognition software. I have made a reasonable attempt to correct obvious errors, but I expect that there are errors of grammar and possibly content that I did not discover before finalizing the note.    This note was electronically signed by Facundo Byrd PA-C

## 2020-06-30 ENCOUNTER — TELEPHONE (OUTPATIENT)
Dept: MEDICAL GROUP | Facility: PHYSICIAN GROUP | Age: 40
End: 2020-06-30

## 2020-06-30 NOTE — TELEPHONE ENCOUNTER
DOCUMENTATION OF PAR STATUS:    1. Name of Medication & Dose:   Methylphenidate HCl ER 10MG er tablets    2. Name of Prescription Coverage Company & phone #: [facility] Commercial Prior Authorization Form for Prescription Drugs    3. Date Prior Auth Submitted: 06/30/2020    4. What information was given to obtain insurance decision? In Chart    5. Prior Auth Status? Pending    6. Patient Notified: yes

## 2020-06-30 NOTE — TELEPHONE ENCOUNTER
MEDICATION PRIOR AUTHORIZATION NEEDED:    1. Name of Medication: Methylphenidate HCl ER 10MG er tablets    2. Requested By (Name of Pharmacy): [facility]      3. Is insurance on file current? Yes    4. What is the name & phone number of the 3rd party payor? [facility] Commercial Prior Authorization Form for Prescription Drugs

## 2020-08-17 ENCOUNTER — PATIENT MESSAGE (OUTPATIENT)
Dept: MEDICAL GROUP | Facility: PHYSICIAN GROUP | Age: 40
End: 2020-08-17

## 2020-08-17 DIAGNOSIS — F90.0 ATTENTION DEFICIT HYPERACTIVITY DISORDER (ADHD), PREDOMINANTLY INATTENTIVE TYPE: ICD-10-CM

## 2020-08-21 RX ORDER — METHYLPHENIDATE HYDROCHLORIDE EXTENDED RELEASE 10 MG/1
TABLET ORAL
Qty: 150 TAB | Refills: 0 | Status: SHIPPED | OUTPATIENT
Start: 2020-10-10 | End: 2020-12-02

## 2020-08-21 RX ORDER — METHYLPHENIDATE HYDROCHLORIDE EXTENDED RELEASE 10 MG/1
TABLET ORAL
Qty: 150 TAB | Refills: 0 | Status: SHIPPED | OUTPATIENT
Start: 2020-09-13 | End: 2020-08-21 | Stop reason: SDUPTHER

## 2020-08-21 RX ORDER — METHYLPHENIDATE HYDROCHLORIDE EXTENDED RELEASE 10 MG/1
TABLET ORAL
Qty: 150 TAB | Refills: 0 | Status: SHIPPED | OUTPATIENT
Start: 2020-08-21 | End: 2020-08-21 | Stop reason: SDUPTHER

## 2020-12-02 ENCOUNTER — APPOINTMENT (OUTPATIENT)
Dept: RADIOLOGY | Facility: MEDICAL CENTER | Age: 40
End: 2020-12-02
Attending: FAMILY MEDICINE
Payer: COMMERCIAL

## 2020-12-02 ENCOUNTER — TELEMEDICINE (OUTPATIENT)
Dept: MEDICAL GROUP | Facility: PHYSICIAN GROUP | Age: 40
End: 2020-12-02
Payer: COMMERCIAL

## 2020-12-02 VITALS — WEIGHT: 210 LBS | HEIGHT: 69 IN | BODY MASS INDEX: 31.1 KG/M2

## 2020-12-02 DIAGNOSIS — R59.0 ENLARGED LYMPH NODES IN ARMPIT: ICD-10-CM

## 2020-12-02 DIAGNOSIS — F90.0 ATTENTION DEFICIT HYPERACTIVITY DISORDER (ADHD), PREDOMINANTLY INATTENTIVE TYPE: ICD-10-CM

## 2020-12-02 PROBLEM — M25.449 SWELLING OF HAND JOINT: Status: RESOLVED | Noted: 2019-07-01 | Resolved: 2020-12-02

## 2020-12-02 PROCEDURE — 99214 OFFICE O/P EST MOD 30 MIN: CPT | Mod: 95,CR | Performed by: FAMILY MEDICINE

## 2020-12-02 RX ORDER — METHYLPHENIDATE HYDROCHLORIDE EXTENDED RELEASE 10 MG/1
TABLET ORAL
Qty: 150 TAB | Refills: 0 | Status: SHIPPED | OUTPATIENT
Start: 2020-12-24 | End: 2021-01-24

## 2020-12-02 RX ORDER — METHYLPHENIDATE HYDROCHLORIDE EXTENDED RELEASE 10 MG/1
10 TABLET ORAL EVERY MORNING
COMMUNITY
End: 2020-12-02

## 2020-12-02 RX ORDER — METHYLPHENIDATE HYDROCHLORIDE EXTENDED RELEASE 10 MG/1
TABLET ORAL
Qty: 150 TAB | Refills: 0 | Status: SHIPPED | OUTPATIENT
Start: 2020-12-02 | End: 2021-01-24

## 2020-12-02 RX ORDER — METHYLPHENIDATE HYDROCHLORIDE EXTENDED RELEASE 10 MG/1
TABLET ORAL
Qty: 150 TAB | Refills: 0 | Status: SHIPPED | OUTPATIENT
Start: 2021-01-24 | End: 2021-02-21

## 2020-12-02 ASSESSMENT — FIBROSIS 4 INDEX: FIB4 SCORE: 1.07

## 2020-12-02 NOTE — ASSESSMENT & PLAN NOTE
This is a chronic health problem for this patient that he is utilizing methylphenidate controlled release taking 30 mg in the morning and 20 mg in the afternoon.  With that dosing he has been able to keep his ADHD well controlled.  He has run out of his prescription because I was off having Covid.  We will renew his prescriptions today.  Obtained and reviewed patient utilization report from [facility] pharmacy database on 12/2/2020 12:32 PM  prior to writing prescription for controlled substance II, III or IV per [place] bill . Based on assessment of the report,my physical exam if necessary and the patient's health problem, the prescription is medically necessary.

## 2020-12-02 NOTE — ASSESSMENT & PLAN NOTE
This is a new problem that has been present for approximately 2 months.  Initially the patient felt a lump in the left axilla that then got enlarged and very tender.  He notes that after about 3 weeks it seemed like the tenderness got better and it seemed to shrink some but did not shrink completely.  He is concerned because it has persisted at that stage.  He has nothing in the right axilla that similar.  I would like to get an ultrasound of the axilla and see if we can identify what that is.

## 2020-12-02 NOTE — PROGRESS NOTES
This evaluation was conducted via Zoom using secure and encrypted videoconferencing technology. The patient was in a private location in the state of [place].    The patient's identity was confirmed and verbal consent was obtained for this virtual visit.        CC:Diagnoses of Attention deficit hyperactivity disorder (ADHD), predominantly inattentive type and Enlarged lymph nodes in armpit were pertinent to this visit.    HISTORY OF PRESENT ILLNESS: Patient is a 40 y.o. male established patient who presents today to get a refill of his methylphenidate that he uses for his ADHD meds.  This is a controlled substance      Enlarged lymph nodes in armpit  This is a new problem that has been present for approximately 2 months.  Initially the patient felt a lump in the left axilla that then got enlarged and very tender.  He notes that after about 3 weeks it seemed like the tenderness got better and it seemed to shrink some but did not shrink completely.  He is concerned because it has persisted at that stage.  He has nothing in the right axilla that similar.  I would like to get an ultrasound of the axilla and see if we can identify what that is.    Attention deficit hyperactivity disorder (ADHD), predominantly inattentive type  This is a chronic health problem for this patient that he is utilizing methylphenidate controlled release taking 30 mg in the morning and 20 mg in the afternoon.  With that dosing he has been able to keep his ADHD well controlled.  He has run out of his prescription because I was off having Covid.  We will renew his prescriptions today.  Obtained and reviewed patient utilization report from [facility] pharmacy database on 12/2/2020 12:32 PM  prior to writing prescription for controlled substance II, III or IV per [place] bill . Based on assessment of the report,my physical exam if necessary and the patient's health problem, the prescription is medically necessary.         Patient Active  Problem List    Diagnosis Date Noted   • Enlarged lymph nodes in armpit 12/02/2020   • Chronic fatigue 07/25/2018   • Well adult exam 01/19/2018   • Attention deficit hyperactivity disorder (ADHD), predominantly inattentive type 01/19/2018      Allergies:Cillins [penicillins]    Current Outpatient Medications   Medication Sig Dispense Refill   • [START ON 1/24/2021] methylphenidate (METADATE ER) 10 MG CR tablet 3 tabs in AM and 2 tabs in  Tab 0   • [START ON 12/24/2020] methylphenidate (METADATE ER) 10 MG CR tablet 3 tabs in AM and 2 tabs in  Tab 0   • methylphenidate (METADATE ER) 10 MG CR tablet 3 tabs in AM and 2 tabs in  Tab 0     No current facility-administered medications for this visit.        Social History     Tobacco Use   • Smoking status: Never Smoker   • Smokeless tobacco: Never Used   Substance Use Topics   • Alcohol use: Yes     Alcohol/week: 1.2 oz     Types: 2 Cans of beer per week   • Drug use: No     Social History     Social History Narrative   • Not on file       Family History   Problem Relation Age of Onset   • No Known Problems Mother    • No Known Problems Father    • Cancer Sister         thyroid        ROS:     - Constitutional:  Negative for fever, chills, unexpected weight change, and fatigue/generalized weakness.    - HEENT:  Negative for headaches, vision changes, hearing changes, ear pain, ear discharge, rhinorrhea, sinus congestion, sore throat, and neck pain.      - Respiratory: Negative for cough, sputum production, chest congestion, dyspnea, wheezing, and crackles.      - Cardiovascular: Negative for chest pain, palpitations, orthopnea, and bilateral lower extremity edema.     - Gastrointestinal: Negative for heartburn, nausea, vomiting, abdominal pain, hematochezia, melena, diarrhea, constipation, and greasy/foul-smelling stools.     - Genitourinary: Negative for dysuria, polyuria, hematuria, pyuria, urinary urgency, and urinary incontinence.     -  "Musculoskeletal: Negative for myalgias, back pain, and joint pain.     - Skin: Persistent lymph node enlarged in left axilla.     - Neurological: Negative for dizziness, tingling, tremors, focal sensory deficit, focal weakness and headaches.     - Endo/Heme/Allergies: Does not bruise/bleed easily.     - Psychiatric/Behavioral: Negative for depression, suicidal/homicidal ideation and memory loss.          - NOTE: All other systems reviewed and are negative, except as in HPI.      Exam:    Ht 1.753 m (5' 9\")   Wt 95.3 kg (210 lb)  Body mass index is 31.01 kg/m².    General:  Well nourished, well developed male in NAD  Head is grossly normal.  .  Patient was seen for 25 minutes face to face of which, 20 minutes was spent counseling regarding discussion of his controlled substance and how we can now do three 30-day prescriptions electronically.  Also discussion of the persistent mass in his left axilla and the work-up that is needed.    Please note that this dictation was created using voice recognition software. I have made every reasonable attempt to correct obvious errors, but I expect that there are errors of grammar and possibly content that I did not discover before finalizing the note.    Assessment/Plan:  1. Attention deficit hyperactivity disorder (ADHD), predominantly inattentive type  Controlled, continue with current meds and lifestyle.    - methylphenidate (METADATE ER) 10 MG CR tablet; 3 tabs in AM and 2 tabs in PM  Dispense: 150 Tab; Refill: 0  - methylphenidate (METADATE ER) 10 MG CR tablet; 3 tabs in AM and 2 tabs in PM  Dispense: 150 Tab; Refill: 0  - methylphenidate (METADATE ER) 10 MG CR tablet; 3 tabs in AM and 2 tabs in PM  Dispense: 150 Tab; Refill: 0    2. Enlarged lymph nodes in armpit  Uncontrolled, will order an ultrasound and hope that we can figure out exactly what is going on there and then proceed with biopsy if needed  - US-SOFT TISSUES OF HEAD - NECK; Future         "

## 2020-12-03 ENCOUNTER — HOSPITAL ENCOUNTER (OUTPATIENT)
Dept: RADIOLOGY | Facility: MEDICAL CENTER | Age: 40
End: 2020-12-03
Attending: FAMILY MEDICINE
Payer: COMMERCIAL

## 2020-12-03 DIAGNOSIS — R59.0 ENLARGED LYMPH NODES IN ARMPIT: ICD-10-CM

## 2020-12-03 PROCEDURE — G0279 TOMOSYNTHESIS, MAMMO: HCPCS

## 2020-12-03 PROCEDURE — 76642 ULTRASOUND BREAST LIMITED: CPT | Mod: LT

## 2021-03-15 ENCOUNTER — OFFICE VISIT (OUTPATIENT)
Dept: URGENT CARE | Facility: CLINIC | Age: 41
End: 2021-03-15
Payer: COMMERCIAL

## 2021-03-15 ENCOUNTER — HOSPITAL ENCOUNTER (OUTPATIENT)
Facility: MEDICAL CENTER | Age: 41
End: 2021-03-15
Attending: PHYSICIAN ASSISTANT
Payer: COMMERCIAL

## 2021-03-15 VITALS
TEMPERATURE: 97.9 F | RESPIRATION RATE: 20 BRPM | OXYGEN SATURATION: 97 % | WEIGHT: 215 LBS | HEIGHT: 69 IN | HEART RATE: 80 BPM | DIASTOLIC BLOOD PRESSURE: 84 MMHG | SYSTOLIC BLOOD PRESSURE: 132 MMHG | BODY MASS INDEX: 31.84 KG/M2

## 2021-03-15 DIAGNOSIS — A49.1 STREPTOCOCCUS INFECTION, GROUP C: ICD-10-CM

## 2021-03-15 DIAGNOSIS — J02.9 SORE THROAT: ICD-10-CM

## 2021-03-15 LAB
INT CON NEG: NEGATIVE
INT CON POS: POSITIVE
S PYO AG THROAT QL: NEGATIVE

## 2021-03-15 PROCEDURE — 87077 CULTURE AEROBIC IDENTIFY: CPT

## 2021-03-15 PROCEDURE — 87070 CULTURE OTHR SPECIMN AEROBIC: CPT

## 2021-03-15 PROCEDURE — 87880 STREP A ASSAY W/OPTIC: CPT | Performed by: PHYSICIAN ASSISTANT

## 2021-03-15 PROCEDURE — 99214 OFFICE O/P EST MOD 30 MIN: CPT | Performed by: PHYSICIAN ASSISTANT

## 2021-03-15 RX ORDER — METHYLPHENIDATE HYDROCHLORIDE 40 MG/1
40 CAPSULE, EXTENDED RELEASE ORAL EVERY MORNING
COMMUNITY

## 2021-03-15 ASSESSMENT — ENCOUNTER SYMPTOMS
SHORTNESS OF BREATH: 0
TROUBLE SWALLOWING: 0
CHILLS: 0
COUGH: 0
FEVER: 0
SORE THROAT: 1

## 2021-03-15 ASSESSMENT — FIBROSIS 4 INDEX: FIB4 SCORE: 1.07

## 2021-03-15 NOTE — PROGRESS NOTES
Subjective:   Xander Saldivar is a 40 y.o. male who presents today with   Chief Complaint   Patient presents with   • Pharyngitis     white spots in his throat, fatigued, strep exposure x 1-2 days        Pharyngitis   This is a new problem. Episode onset: 2 days. The problem has been unchanged. There has been no fever. The pain is mild. Pertinent negatives include no congestion, coughing, shortness of breath or trouble swallowing. He has had exposure to strep. He has tried NSAIDs for the symptoms. The treatment provided no relief.     Strep exposure couple weeks ago. Has episodes of sore throat after he travels multiple times a month.  PMH:  has a past medical history of Attention deficit hyperactivity disorder (ADHD), predominantly inattentive type (1/19/2018), Chronic fatigue (7/25/2018), Enlarged lymph nodes in armpit (12/2/2020), Left upper quadrant abdominal mass (7/25/2018), and Well adult exam (1/19/2018).  MEDS:   Current Outpatient Medications:   •  methylphenidate (METADATE CD) 40 MG ER capsule, Take 40 mg by mouth every morning., Disp: , Rfl:   ALLERGIES:   Allergies   Allergen Reactions   • Cillins [Penicillins]      Hives, swelling      SURGHX:   Past Surgical History:   Procedure Laterality Date   • ACL RECONSTRUCTION SCOPE Right    • DENTAL EXTRACTION(S)       SOCHX:  reports that he has never smoked. He has never used smokeless tobacco. He reports current alcohol use of about 1.2 oz of alcohol per week. He reports that he does not use drugs.  FH: Reviewed with patient, not pertinent to this visit.       Review of Systems   Constitutional: Negative for chills and fever.   HENT: Positive for sore throat. Negative for congestion and trouble swallowing.    Respiratory: Negative for cough and shortness of breath.         Objective:   /84 (BP Location: Left arm, Patient Position: Sitting, BP Cuff Size: Adult long)   Pulse 80   Temp 36.6 °C (97.9 °F) (Temporal)   Resp 20   Ht 1.753 m (5'  "9\")   Wt 97.5 kg (215 lb)   SpO2 97%   BMI 31.75 kg/m²   Physical Exam  Vitals and nursing note reviewed.   Constitutional:       General: He is not in acute distress.     Appearance: Normal appearance. He is well-developed. He is not ill-appearing or toxic-appearing.   HENT:      Head: Normocephalic and atraumatic.      Right Ear: Hearing normal.      Left Ear: Hearing normal.      Mouth/Throat:      Pharynx: Posterior oropharyngeal erythema present.      Comments: Left sided tonsillar stone  Eyes:      Conjunctiva/sclera: Conjunctivae normal.   Cardiovascular:      Rate and Rhythm: Normal rate and regular rhythm.      Heart sounds: Normal heart sounds.   Pulmonary:      Effort: Pulmonary effort is normal.   Musculoskeletal:      Comments: Normal movement in all 4 extremities   Skin:     General: Skin is warm and dry.   Neurological:      Mental Status: He is alert.      Coordination: Coordination normal.   Psychiatric:         Mood and Affect: Mood normal.         STREP A NEG  Assessment/Plan:   Assessment    1. Sore throat  - POCT Rapid Strep A    Other orders  - methylphenidate (METADATE CD) 40 MG ER capsule; Take 40 mg by mouth every morning.  Patient would like to have throat culture done at this time.  Discussed them this is more likely due to fatigue/travel.  Also does appear consistent with tonsillar stone.  Gargle, lozenges. We will follow up with culture.   Differential diagnosis, natural history, supportive care, and indications for immediate follow-up discussed.   Patient given instructions and understanding of medications and treatment.    If not improving in 3-5 days, F/U with PCP or return to  if symptoms worsen.  Greater than 30 minutes were spent reviewing patient's chart, examining and obtaining history from patient, and discussing plan of care.     Patient agreeable to plan.    Please note that this dictation was created using voice recognition software. I have made every reasonable attempt " to correct obvious errors, but I expect that there are errors of grammar and possibly content that I did not discover before finalizing the note.    Raad Mcnamara PA-C

## 2021-03-18 ENCOUNTER — TELEPHONE (OUTPATIENT)
Dept: URGENT CARE | Facility: CLINIC | Age: 41
End: 2021-03-18

## 2021-03-18 LAB
BACTERIA SPEC RESP CULT: ABNORMAL
BACTERIA SPEC RESP CULT: ABNORMAL
SIGNIFICANT IND 70042: ABNORMAL
SITE SITE: ABNORMAL
SOURCE SOURCE: ABNORMAL

## 2021-03-18 RX ORDER — CEFUROXIME AXETIL 250 MG/1
250 TABLET ORAL 2 TIMES DAILY
Qty: 20 TABLET | Refills: 0 | Status: SHIPPED | OUTPATIENT
Start: 2021-03-18 | End: 2021-03-28

## 2021-04-13 ENCOUNTER — HOSPITAL ENCOUNTER (OUTPATIENT)
Dept: LAB | Facility: MEDICAL CENTER | Age: 41
End: 2021-04-13
Attending: FAMILY MEDICINE
Payer: COMMERCIAL

## 2021-04-13 LAB
ALBUMIN SERPL BCP-MCNC: 4.5 G/DL (ref 3.2–4.9)
ALBUMIN/GLOB SERPL: 1.9 G/DL
ALP SERPL-CCNC: 54 U/L (ref 30–99)
ALT SERPL-CCNC: 34 U/L (ref 2–50)
ANION GAP SERPL CALC-SCNC: 8 MMOL/L (ref 7–16)
AST SERPL-CCNC: 26 U/L (ref 12–45)
BASOPHILS # BLD AUTO: 0.8 % (ref 0–1.8)
BASOPHILS # BLD: 0.05 K/UL (ref 0–0.12)
BILIRUB SERPL-MCNC: 0.5 MG/DL (ref 0.1–1.5)
BUN SERPL-MCNC: 12 MG/DL (ref 8–22)
CALCIUM SERPL-MCNC: 9.3 MG/DL (ref 8.5–10.5)
CHLORIDE SERPL-SCNC: 102 MMOL/L (ref 96–112)
CO2 SERPL-SCNC: 24 MMOL/L (ref 20–33)
CREAT SERPL-MCNC: 0.86 MG/DL (ref 0.5–1.4)
EOSINOPHIL # BLD AUTO: 0.63 K/UL (ref 0–0.51)
EOSINOPHIL NFR BLD: 9.7 % (ref 0–6.9)
ERYTHROCYTE [DISTWIDTH] IN BLOOD BY AUTOMATED COUNT: 39.8 FL (ref 35.9–50)
GLOBULIN SER CALC-MCNC: 2.4 G/DL (ref 1.9–3.5)
GLUCOSE SERPL-MCNC: 91 MG/DL (ref 65–99)
HCT VFR BLD AUTO: 47.3 % (ref 42–52)
HGB BLD-MCNC: 16.2 G/DL (ref 14–18)
IMM GRANULOCYTES # BLD AUTO: 0.02 K/UL (ref 0–0.11)
IMM GRANULOCYTES NFR BLD AUTO: 0.3 % (ref 0–0.9)
LYMPHOCYTES # BLD AUTO: 1.86 K/UL (ref 1–4.8)
LYMPHOCYTES NFR BLD: 28.5 % (ref 22–41)
MCH RBC QN AUTO: 31.1 PG (ref 27–33)
MCHC RBC AUTO-ENTMCNC: 34.2 G/DL (ref 33.7–35.3)
MCV RBC AUTO: 90.8 FL (ref 81.4–97.8)
MONOCYTES # BLD AUTO: 0.5 K/UL (ref 0–0.85)
MONOCYTES NFR BLD AUTO: 7.7 % (ref 0–13.4)
NEUTROPHILS # BLD AUTO: 3.46 K/UL (ref 1.82–7.42)
NEUTROPHILS NFR BLD: 53 % (ref 44–72)
NRBC # BLD AUTO: 0 K/UL
NRBC BLD-RTO: 0 /100 WBC
PLATELET # BLD AUTO: 212 K/UL (ref 164–446)
PMV BLD AUTO: 10.3 FL (ref 9–12.9)
POTASSIUM SERPL-SCNC: 4.1 MMOL/L (ref 3.6–5.5)
PROT SERPL-MCNC: 6.9 G/DL (ref 6–8.2)
RBC # BLD AUTO: 5.21 M/UL (ref 4.7–6.1)
SODIUM SERPL-SCNC: 134 MMOL/L (ref 135–145)
TSH SERPL DL<=0.005 MIU/L-ACNC: 1.06 UIU/ML (ref 0.38–5.33)
WBC # BLD AUTO: 6.5 K/UL (ref 4.8–10.8)

## 2021-04-13 PROCEDURE — 85025 COMPLETE CBC W/AUTO DIFF WBC: CPT

## 2021-04-13 PROCEDURE — 80053 COMPREHEN METABOLIC PANEL: CPT

## 2021-04-13 PROCEDURE — 84443 ASSAY THYROID STIM HORMONE: CPT

## 2021-04-13 PROCEDURE — 85652 RBC SED RATE AUTOMATED: CPT

## 2021-04-13 PROCEDURE — 36415 COLL VENOUS BLD VENIPUNCTURE: CPT

## 2021-04-13 PROCEDURE — 87040 BLOOD CULTURE FOR BACTERIA: CPT

## 2021-04-14 LAB — ERYTHROCYTE [SEDIMENTATION RATE] IN BLOOD BY WESTERGREN METHOD: <1 MM/HOUR (ref 0–15)

## 2021-04-18 LAB
BACTERIA BLD CULT: NORMAL
BACTERIA BLD CULT: NORMAL
SIGNIFICANT IND 70042: NORMAL
SIGNIFICANT IND 70042: NORMAL
SITE SITE: NORMAL
SITE SITE: NORMAL
SOURCE SOURCE: NORMAL
SOURCE SOURCE: NORMAL

## 2022-12-23 ENCOUNTER — APPOINTMENT (OUTPATIENT)
Dept: URGENT CARE | Facility: CLINIC | Age: 42
End: 2022-12-23
Payer: COMMERCIAL

## 2023-04-25 ENCOUNTER — APPOINTMENT (RX ONLY)
Dept: URBAN - METROPOLITAN AREA CLINIC 6 | Facility: CLINIC | Age: 43
Setting detail: DERMATOLOGY
End: 2023-04-25

## 2023-04-25 DIAGNOSIS — L81.4 OTHER MELANIN HYPERPIGMENTATION: ICD-10-CM

## 2023-04-25 DIAGNOSIS — L57.0 ACTINIC KERATOSIS: ICD-10-CM

## 2023-04-25 DIAGNOSIS — D22 MELANOCYTIC NEVI: ICD-10-CM

## 2023-04-25 DIAGNOSIS — Z71.89 OTHER SPECIFIED COUNSELING: ICD-10-CM

## 2023-04-25 PROBLEM — D48.5 NEOPLASM OF UNCERTAIN BEHAVIOR OF SKIN: Status: ACTIVE | Noted: 2023-04-25

## 2023-04-25 PROBLEM — D22.62 MELANOCYTIC NEVI OF LEFT UPPER LIMB, INCLUDING SHOULDER: Status: ACTIVE | Noted: 2023-04-25

## 2023-04-25 PROBLEM — D22.5 MELANOCYTIC NEVI OF TRUNK: Status: ACTIVE | Noted: 2023-04-25

## 2023-04-25 PROBLEM — D22.61 MELANOCYTIC NEVI OF RIGHT UPPER LIMB, INCLUDING SHOULDER: Status: ACTIVE | Noted: 2023-04-25

## 2023-04-25 PROCEDURE — 17000 DESTRUCT PREMALG LESION: CPT

## 2023-04-25 PROCEDURE — ? BIOPSY BY SHAVE METHOD

## 2023-04-25 PROCEDURE — 99203 OFFICE O/P NEW LOW 30 MIN: CPT | Mod: 25

## 2023-04-25 PROCEDURE — ? LIQUID NITROGEN

## 2023-04-25 PROCEDURE — 69100 BIOPSY OF EXTERNAL EAR: CPT | Mod: 59

## 2023-04-25 PROCEDURE — ? COUNSELING

## 2023-04-25 ASSESSMENT — LOCATION SIMPLE DESCRIPTION DERM
LOCATION SIMPLE: RIGHT FOREARM
LOCATION SIMPLE: RIGHT LOWER BACK
LOCATION SIMPLE: RIGHT UPPER BACK
LOCATION SIMPLE: LEFT FOREARM
LOCATION SIMPLE: LEFT FOREHEAD
LOCATION SIMPLE: LEFT CHEEK

## 2023-04-25 ASSESSMENT — LOCATION DETAILED DESCRIPTION DERM
LOCATION DETAILED: RIGHT MID-UPPER BACK
LOCATION DETAILED: LEFT FOREHEAD
LOCATION DETAILED: LEFT CENTRAL MALAR CHEEK
LOCATION DETAILED: RIGHT SUPERIOR LATERAL MIDBACK
LOCATION DETAILED: LEFT PROXIMAL DORSAL FOREARM
LOCATION DETAILED: RIGHT PROXIMAL DORSAL FOREARM
LOCATION DETAILED: RIGHT VENTRAL DISTAL FOREARM
LOCATION DETAILED: LEFT VENTRAL DISTAL FOREARM

## 2023-04-25 ASSESSMENT — LOCATION ZONE DERM
LOCATION ZONE: ARM
LOCATION ZONE: FACE
LOCATION ZONE: TRUNK

## 2023-04-25 NOTE — PROCEDURE: LIQUID NITROGEN
Render Note In Bullet Format When Appropriate: No
Consent: The patient's consent was obtained including but not limited to risks of crusting, scabbing, blistering, scarring, darker or lighter pigmentary change, recurrence, incomplete removal and infection.
Show Aperture Variable?: Yes
Post-Care Instructions: I reviewed with the patient in detail post-care instructions. Patient is to wear sunprotection, and avoid picking at any of the treated lesions. Pt may apply Vaseline to crusted or scabbing areas.
Detail Level: Simple
Duration Of Freeze Thaw-Cycle (Seconds): 0

## 2023-04-25 NOTE — PROCEDURE: BIOPSY BY SHAVE METHOD
Detail Level: Detailed
Depth Of Biopsy: dermis
Was A Bandage Applied: Yes
Size Of Lesion In Cm: 0.6
X Size Of Lesion In Cm: 0
Biopsy Type: H and E
Biopsy Method: Dermablade
Anesthesia Type: 1% lidocaine with epinephrine
Anesthesia Volume In Cc: 0.5
Hemostasis: Drysol
Wound Care: Petrolatum
Dressing: bandage
Destruction After The Procedure: No
Type Of Destruction Used: Electrodesiccation and Curettage
Curettage Text: The wound bed was treated with curettage after the biopsy was performed.
Cryotherapy Text: The wound bed was treated with cryotherapy after the biopsy was performed.
Electrodesiccation Text: The wound bed was treated with electrodesiccation after the biopsy was performed.
Electrodesiccation And Curettage Text: The wound bed was treated with electrodesiccation and curettage after the biopsy was performed. Treated x 3
Silver Nitrate Text: The wound bed was treated with silver nitrate after the biopsy was performed.
Lab: [id number]
Lab Facility: 
Consent: Written consent was obtained and risks were reviewed including but not limited to scarring, infection, bleeding, scabbing, incomplete removal, nerve damage and allergy to anesthesia.
Post-Care Instructions: I reviewed with the patient in detail post-care instructions. Patient is to keep the biopsy site dry overnight, and then apply bacitracin twice daily until healed. Patient may apply hydrogen peroxide soaks to remove any crusting.
Notification Instructions: Patient will be notified of biopsy results. However, patient instructed to call the office if not contacted within 2 weeks.
Billing Type: Third-Party Bill
Information: Selecting Yes will display possible errors in your note based on the variables you have selected. This validation is only offered as a suggestion for you. PLEASE NOTE THAT THE VALIDATION TEXT WILL BE REMOVED WHEN YOU FINALIZE YOUR NOTE. IF YOU WANT TO FAX A PRELIMINARY NOTE YOU WILL NEED TO TOGGLE THIS TO 'NO' IF YOU DO NOT WANT IT IN YOUR FAXED NOTE.

## 2023-05-10 ENCOUNTER — APPOINTMENT (RX ONLY)
Dept: URBAN - METROPOLITAN AREA CLINIC 6 | Facility: CLINIC | Age: 43
Setting detail: DERMATOLOGY
End: 2023-05-10

## 2023-05-10 DIAGNOSIS — L57.0 ACTINIC KERATOSIS: ICD-10-CM

## 2023-05-10 DIAGNOSIS — L81.4 OTHER MELANIN HYPERPIGMENTATION: ICD-10-CM

## 2023-05-10 PROBLEM — D48.5 NEOPLASM OF UNCERTAIN BEHAVIOR OF SKIN: Status: ACTIVE | Noted: 2023-05-10

## 2023-05-10 PROCEDURE — ? DEFER

## 2023-05-10 PROCEDURE — ? PHOTO-DOCUMENTATION

## 2023-05-10 PROCEDURE — ? PRESCRIPTION

## 2023-05-10 PROCEDURE — ? COUNSELING

## 2023-05-10 PROCEDURE — ? ADDITIONAL NOTES

## 2023-05-10 PROCEDURE — 99213 OFFICE O/P EST LOW 20 MIN: CPT

## 2023-05-10 RX ORDER — ACYCLOVIR 400 MG/1
TABLET ORAL TID
Qty: 15 | Refills: 0 | Status: ERX | COMMUNITY
Start: 2023-05-10

## 2023-05-10 RX ADMIN — ACYCLOVIR 1: 400 TABLET ORAL at 00:00

## 2023-05-10 ASSESSMENT — LOCATION DETAILED DESCRIPTION DERM
LOCATION DETAILED: RIGHT INFERIOR VERMILION LIP
LOCATION DETAILED: LEFT INFERIOR VERMILION LIP

## 2023-05-10 ASSESSMENT — LOCATION SIMPLE DESCRIPTION DERM
LOCATION SIMPLE: RIGHT LIP
LOCATION SIMPLE: LEFT LIP

## 2023-05-10 ASSESSMENT — LOCATION ZONE DERM: LOCATION ZONE: LIP

## 2023-05-10 NOTE — PROCEDURE: ADDITIONAL NOTES
Additional Notes: Pt states lesion always recurs, will schedule in 6 weeks for a punch biopsy.
Detail Level: Simple
Render Risk Assessment In Note?: yes

## 2023-05-10 NOTE — PROCEDURE: DEFER
Detail Level: Detailed
Introduction Text (Please End With A Colon): The following procedure was deferred: LN2
Size Of Lesion In Cm (Optional): 0
Instructions (Optional): going out of town and will reschedule for after he returns
Procedure To Be Performed At Next Visit: Cryotherapy
17

## 2024-01-10 ENCOUNTER — APPOINTMENT (RX ONLY)
Dept: URBAN - METROPOLITAN AREA CLINIC 6 | Facility: CLINIC | Age: 44
Setting detail: DERMATOLOGY
End: 2024-01-10

## 2024-01-10 DIAGNOSIS — L57.0 ACTINIC KERATOSIS: ICD-10-CM

## 2024-01-10 DIAGNOSIS — L90.5 SCAR CONDITIONS AND FIBROSIS OF SKIN: ICD-10-CM

## 2024-01-10 PROCEDURE — 99212 OFFICE O/P EST SF 10 MIN: CPT | Mod: 25

## 2024-01-10 PROCEDURE — ? PRESCRIPTION

## 2024-01-10 PROCEDURE — ? COUNSELING

## 2024-01-10 PROCEDURE — 17000 DESTRUCT PREMALG LESION: CPT

## 2024-01-10 PROCEDURE — ? LIQUID NITROGEN

## 2024-01-10 PROCEDURE — ? ADDITIONAL NOTES

## 2024-01-10 RX ORDER — ACYCLOVIR 400 MG/1
TABLET ORAL TID
Qty: 15 | Refills: 1 | Status: ERX

## 2024-01-10 ASSESSMENT — LOCATION SIMPLE DESCRIPTION DERM
LOCATION SIMPLE: LEFT LIP
LOCATION SIMPLE: LEFT EAR

## 2024-01-10 ASSESSMENT — LOCATION ZONE DERM
LOCATION ZONE: LIP
LOCATION ZONE: EAR

## 2024-01-10 ASSESSMENT — LOCATION DETAILED DESCRIPTION DERM
LOCATION DETAILED: LEFT ANTITRAGUS
LOCATION DETAILED: LEFT INFERIOR VERMILION LIP

## 2024-01-10 NOTE — PROCEDURE: ADDITIONAL NOTES
Detail Level: Simple
Render Risk Assessment In Note?: yes
Additional Notes: previous biopsy 4/2023 with no residual lesion\\n\\nA. Left Anterior Earlobe, Biopsy by Shave Method\\nSUPERFICIAL DERMAL FIBROSIS, WITH VASCULAR ECTASIA (SEE COMMENT)\\nCOMMENT: The findings are not entirely specific. Considerations include an angiofibroma and\\na traumatized hemangioma. We reviewed a clinical photograph showing an area of erythema\\nand apparent swelling involving the pinna. A punch biopsy would be appropriate for further\\nevaluation.

## 2024-06-19 ENCOUNTER — APPOINTMENT (RX ONLY)
Dept: URBAN - METROPOLITAN AREA CLINIC 6 | Facility: CLINIC | Age: 44
Setting detail: DERMATOLOGY
End: 2024-06-19

## 2024-06-19 DIAGNOSIS — L73.8 OTHER SPECIFIED FOLLICULAR DISORDERS: ICD-10-CM

## 2024-06-19 DIAGNOSIS — I78.8 OTHER DISEASES OF CAPILLARIES: ICD-10-CM

## 2024-06-19 DIAGNOSIS — L81.4 OTHER MELANIN HYPERPIGMENTATION: ICD-10-CM

## 2024-06-19 DIAGNOSIS — L90.5 SCAR CONDITIONS AND FIBROSIS OF SKIN: ICD-10-CM

## 2024-06-19 DIAGNOSIS — Z71.89 OTHER SPECIFIED COUNSELING: ICD-10-CM

## 2024-06-19 DIAGNOSIS — B00.1 HERPESVIRAL VESICULAR DERMATITIS: ICD-10-CM

## 2024-06-19 DIAGNOSIS — L57.0 ACTINIC KERATOSIS: ICD-10-CM

## 2024-06-19 PROCEDURE — 99213 OFFICE O/P EST LOW 20 MIN: CPT

## 2024-06-19 PROCEDURE — ? DEFER

## 2024-06-19 PROCEDURE — ? ADDITIONAL NOTES

## 2024-06-19 PROCEDURE — ? COUNSELING

## 2024-06-19 PROCEDURE — ? DIAGNOSIS COMMENT

## 2024-06-19 PROCEDURE — ? PRESCRIPTION

## 2024-06-19 RX ORDER — ACYCLOVIR 400 MG/1
1 TABLET ORAL TID
Qty: 15 | Refills: 1 | Status: ERX

## 2024-06-19 ASSESSMENT — LOCATION ZONE DERM
LOCATION ZONE: EAR
LOCATION ZONE: FACE
LOCATION ZONE: LIP

## 2024-06-19 ASSESSMENT — LOCATION SIMPLE DESCRIPTION DERM
LOCATION SIMPLE: LEFT LIP
LOCATION SIMPLE: LEFT FOREHEAD
LOCATION SIMPLE: LEFT EAR
LOCATION SIMPLE: RIGHT LIP

## 2024-06-19 ASSESSMENT — LOCATION DETAILED DESCRIPTION DERM
LOCATION DETAILED: RIGHT INFERIOR VERMILION LIP
LOCATION DETAILED: LEFT INFERIOR VERMILION LIP
LOCATION DETAILED: LEFT ANTITRAGUS
LOCATION DETAILED: LEFT FOREHEAD

## 2024-06-19 NOTE — PROCEDURE: DIAGNOSIS COMMENT
Comment: He is going out of town tomorrow and would like to postpone LN2 for a few weeks.  If does not resolve may need to take a biopsy
Detail Level: Simple
Render Risk Assessment In Note?: no

## 2024-11-13 ENCOUNTER — APPOINTMENT (RX ONLY)
Dept: URBAN - METROPOLITAN AREA CLINIC 6 | Facility: CLINIC | Age: 44
Setting detail: DERMATOLOGY
End: 2024-11-13

## 2024-11-13 DIAGNOSIS — B00.1 HERPESVIRAL VESICULAR DERMATITIS: ICD-10-CM

## 2024-11-13 DIAGNOSIS — L57.0 ACTINIC KERATOSIS: ICD-10-CM

## 2024-11-13 PROCEDURE — ? ADDITIONAL NOTES

## 2024-11-13 PROCEDURE — 17003 DESTRUCT PREMALG LES 2-14: CPT

## 2024-11-13 PROCEDURE — 99213 OFFICE O/P EST LOW 20 MIN: CPT | Mod: 25

## 2024-11-13 PROCEDURE — ? PRESCRIPTION

## 2024-11-13 PROCEDURE — ? COUNSELING

## 2024-11-13 PROCEDURE — 17000 DESTRUCT PREMALG LESION: CPT

## 2024-11-13 PROCEDURE — ? LIQUID NITROGEN

## 2024-11-13 RX ORDER — VALACYCLOVIR 500 MG/1
TABLET, FILM COATED ORAL
Qty: 10 | Refills: 1 | Status: ERX | COMMUNITY
Start: 2024-11-13

## 2024-11-13 RX ADMIN — VALACYCLOVIR: 500 TABLET, FILM COATED ORAL at 00:00

## 2024-11-13 ASSESSMENT — LOCATION SIMPLE DESCRIPTION DERM
LOCATION SIMPLE: RIGHT INFERIOR MUCOSAL LIP
LOCATION SIMPLE: LEFT LIP
LOCATION SIMPLE: LEFT INFERIOR LIP

## 2024-11-13 ASSESSMENT — LOCATION DETAILED DESCRIPTION DERM
LOCATION DETAILED: RIGHT INFERIOR MUCOSAL LIP
LOCATION DETAILED: LEFT INFERIOR VERMILION LIP

## 2024-11-13 ASSESSMENT — LOCATION ZONE DERM: LOCATION ZONE: LIP

## 2024-11-13 NOTE — PROCEDURE: ADDITIONAL NOTES
Detail Level: Simple
Render Risk Assessment In Note?: yes
Additional Notes: Discussed chemo cream vs liquid nitrogen. Will treat with LN2

## 2024-11-13 NOTE — PROCEDURE: LIQUID NITROGEN
Consent: The patient's consent was obtained including but not limited to risks of crusting, scabbing, blistering, scarring, darker or lighter pigmentary change, recurrence, incomplete removal and infection.
Render Post-Care Instructions In Note?: no
Post-Care Instructions: I reviewed with the patient in detail post-care instructions. Patient is to wear sunprotection, and avoid picking at any of the treated lesions. Pt may apply Vaseline to crusted or scabbing areas.
Duration Of Freeze Thaw-Cycle (Seconds): 0
Show Applicator Variable?: Yes
Detail Level: Simple

## 2025-04-10 ENCOUNTER — APPOINTMENT (OUTPATIENT)
Dept: URBAN - METROPOLITAN AREA CLINIC 6 | Facility: CLINIC | Age: 45
Setting detail: DERMATOLOGY
End: 2025-04-10

## 2025-04-10 DIAGNOSIS — L56.8 OTHER SPECIFIED ACUTE SKIN CHANGES DUE TO ULTRAVIOLET RADIATION: ICD-10-CM | Status: STABLE

## 2025-04-10 PROCEDURE — ? COUNSELING

## 2025-04-10 PROCEDURE — ? ADDITIONAL NOTES

## 2025-04-10 PROCEDURE — 99212 OFFICE O/P EST SF 10 MIN: CPT

## 2025-04-10 ASSESSMENT — LOCATION SIMPLE DESCRIPTION DERM: LOCATION SIMPLE: LEFT LIP

## 2025-04-10 ASSESSMENT — LOCATION ZONE DERM: LOCATION ZONE: LIP

## 2025-04-10 ASSESSMENT — LOCATION DETAILED DESCRIPTION DERM: LOCATION DETAILED: LEFT INFERIOR VERMILION LIP

## 2025-04-10 NOTE — PROCEDURE: ADDITIONAL NOTES
Additional Notes: Significant improvement with LN2 at last visit\\nHas some mottling and actinic damage but no active precancerous lesions on exam\\nContinue sun protection strategies\\nWill continue to monitor/pt to contact us with any earlier concerns.
Render Risk Assessment In Note?: yes
Detail Level: Simple